# Patient Record
Sex: FEMALE | Race: BLACK OR AFRICAN AMERICAN | NOT HISPANIC OR LATINO
[De-identification: names, ages, dates, MRNs, and addresses within clinical notes are randomized per-mention and may not be internally consistent; named-entity substitution may affect disease eponyms.]

---

## 2022-01-26 ENCOUNTER — NON-APPOINTMENT (OUTPATIENT)
Age: 47
End: 2022-01-26

## 2022-01-26 ENCOUNTER — APPOINTMENT (OUTPATIENT)
Dept: PHYSICAL MEDICINE AND REHAB | Facility: CLINIC | Age: 47
End: 2022-01-26
Payer: COMMERCIAL

## 2022-01-26 VITALS
HEIGHT: 67 IN | DIASTOLIC BLOOD PRESSURE: 80 MMHG | SYSTOLIC BLOOD PRESSURE: 124 MMHG | OXYGEN SATURATION: 98 % | WEIGHT: 200 LBS | TEMPERATURE: 97.3 F | RESPIRATION RATE: 17 BRPM | BODY MASS INDEX: 31.39 KG/M2 | HEART RATE: 87 BPM

## 2022-01-26 PROBLEM — Z00.00 ENCOUNTER FOR PREVENTIVE HEALTH EXAMINATION: Status: ACTIVE | Noted: 2022-01-26

## 2022-01-26 PROCEDURE — 36415 COLL VENOUS BLD VENIPUNCTURE: CPT

## 2022-01-26 PROCEDURE — 99215 OFFICE O/P EST HI 40 MIN: CPT | Mod: 25

## 2022-01-27 LAB
CCP AB SER IA-ACNC: <8 UNITS
CRP SERPL-MCNC: 8 MG/L
ERYTHROCYTE [SEDIMENTATION RATE] IN BLOOD BY WESTERGREN METHOD: 36 MM/HR
RF+CCP IGG SER-IMP: NEGATIVE
RHEUMATOID FACT SER QL: <10 IU/ML

## 2022-01-30 LAB — ANA SER IF-ACNC: NEGATIVE

## 2022-01-30 NOTE — HISTORY OF PRESENT ILLNESS
[FreeTextEntry1] : Patient was last seen in the office on June 20, 2019\par \par 46-year-old female presents for diffuse joint pain \par The patient denies a fall or overuse\par \par left elbow pain\par Pain:  7/10 Worse: 10/10 Quality: sharp  Frequency: constant\par The pain starts in the elbow and radiates to the fingers.  The pain is aggravated with .\par \par bilateral hips\par Pain:  8/10 Worse: 10/10 Quality: sharp  Frequency: constant\par Patient starts on the outside of her hips.  The pain is worse with sitting more than 20 minutes.  She cannot lay on either side of her body at night due to pain.\par \par low back pain\par Pain:  8/10 Worse: 10/10 Quality: sharp  Frequency: constant\par She has a constant stiffness that starts in the middle of her back radiates out to the side of her hips.  She denies pain radiating down the legs.  Her pain is worse with trying to stand after period of sit.  She denies bowel bladder difficulties.\par

## 2022-01-30 NOTE — DATA REVIEWED
[FreeTextEntry1] : \par EMG/NCV of the upper extremities formed on June 20, 2019\par 1.	Normal EMG/NCV. Her’s cervical pain is not a confounding source of her right shoulder pain.\par \par RIGHT KNEE XRAY AP LATERAL AND OBLIQUE 3 VIEWS\par IMPRESSION: There are mild osteoarthritic degenerative changes. M17.11\par There are no fractures.\par \par Xray of the shoulder neg.\par Xray of the lumbar: mild arthritis.\par MRI of the cervical spine reveals thickening of the longitudinal ligament T1/T2  effacing the epidural space. Reversal of cervical lordosis\par Mri of the  right shoulder reveals low grade partial thickness tear of the inferior fibers of the infraspinatus tendon. Suspected SLAP tear of the labrum\par

## 2022-01-30 NOTE — PHYSICAL EXAM
[FreeTextEntry1] : Physical Exam\par \par General:\par     well developed, well nourished, in no acute distress.  \par Head:\par     normocephalic and atraumatic.  \par Eyes:\par     PERRL/EOM intact, conjunctiva and sclera clear with out nystagmus.  \par Ears:\par     TM's intact and clear with normal canals with grossly normal hearing.  \par Nose:\par     no deformity, discharge, inflammation, or lesions.  \par Mouth:\par     no deformity or lesions with good dentition.  \par Neck:\par    Spasm in the paracervical muscles R>L. negative Spurling's\par Chest Wall:\par     no deformities \par Lungs:\par     clear bilaterally to auscultation.  \par Heart:\par     non-displaced PMI, chest non-tender; regular rate and rhythm, S1, S2 without murmurs, rubs, or gallops\par Abdomen:\par      normal bowel sounds; no hepatosplenomegaly no ventral,umbilical hernias or masses noted.  \par Msk:\par      thoracic spine: Tenderness in the paraspinal muscles between the shoulder blades. \par Pulses:\par     pulses normal in all 4 extremities.  \par Extremities:\par     LUE: Shoulder flexion 0-100 abduction 0-100 MS 4-/5 mild tender\par Elbow FAROM, MS 4/5 reflexes 2/4 mild tender with pain on resisted wrist extension\par Wrist FAROM, MS 5/5 reflexes 2/4\par sensation is intact to light touch, pinprick and proprioception\par \par RUE: Shoulder flexion 100 abduction 0-100 MS 4-/5 + tenderness in the anterior and posterior capsule. tender over the top of the shoulder\par Elbow FAROM, MS 4/5 reflexes 2/4\par Wrist FAROM, MS 5/5 reflexes 2/4\par sensation is intact to light touch, pinprick and proprioception\par \par LLE: Hip FAROM, MS 4/5 and on palpation of the hip bursa\par knee: FAROM, MS 5/5 Reflexes 2/4\par ankle: FAROM, MS 5/5 reflexes 2/4\par sensation is intact to light touch, pinprick and proprioception\par \par RLE: Hip FAROM, MS 4/5 and on palpation of the hip bursa\par knee: FAROM, MS 5/5 Reflexes 2/4\par ankle: FAROM, MS 5/5 reflexes 2/4\par sensation is intact to light touch, pinprick and proprioception\par \par Neurologic:\par     Gait: Spontaneous, reciprocal and safe without an assistive device \par \par

## 2022-02-01 LAB — DSDNA AB SER-ACNC: 16 IU/ML

## 2022-02-21 DIAGNOSIS — Z78.9 OTHER SPECIFIED HEALTH STATUS: ICD-10-CM

## 2022-02-21 RX ORDER — CYCLOBENZAPRINE HYDROCHLORIDE 5 MG/1
5 TABLET, FILM COATED ORAL
Refills: 0 | Status: ACTIVE | COMMUNITY

## 2022-02-21 RX ORDER — GABAPENTIN 100 MG/1
100 CAPSULE ORAL
Refills: 0 | Status: ACTIVE | COMMUNITY

## 2022-02-21 RX ORDER — FLUTICASONE PROPIONATE 50 MCG
50 SPRAY, SUSPENSION NASAL
Refills: 0 | Status: ACTIVE | COMMUNITY

## 2022-03-03 ENCOUNTER — APPOINTMENT (OUTPATIENT)
Dept: PHYSICAL MEDICINE AND REHAB | Facility: CLINIC | Age: 47
End: 2022-03-03

## 2022-03-04 ENCOUNTER — APPOINTMENT (OUTPATIENT)
Dept: RHEUMATOLOGY | Facility: CLINIC | Age: 47
End: 2022-03-04
Payer: COMMERCIAL

## 2022-03-04 VITALS
HEIGHT: 67 IN | OXYGEN SATURATION: 98 % | WEIGHT: 200 LBS | SYSTOLIC BLOOD PRESSURE: 177 MMHG | DIASTOLIC BLOOD PRESSURE: 108 MMHG | BODY MASS INDEX: 31.39 KG/M2 | HEART RATE: 83 BPM | TEMPERATURE: 97.3 F

## 2022-03-04 LAB
ALBUMIN SERPL ELPH-MCNC: 4.8 G/DL
ALP BLD-CCNC: 52 U/L
ALT SERPL-CCNC: 14 U/L
ANION GAP SERPL CALC-SCNC: 12 MMOL/L
AST SERPL-CCNC: 19 U/L
BASOPHILS # BLD AUTO: 0.04 K/UL
BASOPHILS NFR BLD AUTO: 0.6 %
BILIRUB SERPL-MCNC: 0.3 MG/DL
BUN SERPL-MCNC: 11 MG/DL
CALCIUM SERPL-MCNC: 10.1 MG/DL
CHLORIDE SERPL-SCNC: 101 MMOL/L
CO2 SERPL-SCNC: 26 MMOL/L
CREAT SERPL-MCNC: 0.7 MG/DL
CRP SERPL-MCNC: 7 MG/L
DEPRECATED KAPPA LC FREE/LAMBDA SER: 1.4 RATIO
EGFR: 109 ML/MIN/1.73M2
EOSINOPHIL # BLD AUTO: 0.11 K/UL
EOSINOPHIL NFR BLD AUTO: 1.6 %
GLUCOSE SERPL-MCNC: 90 MG/DL
HCT VFR BLD CALC: 36.2 %
HGB BLD-MCNC: 11.8 G/DL
IGA SER QL IEP: 185 MG/DL
IGG SER QL IEP: 1631 MG/DL
IGM SER QL IEP: 138 MG/DL
IMM GRANULOCYTES NFR BLD AUTO: 0.1 %
KAPPA LC CSF-MCNC: 1.41 MG/DL
KAPPA LC SERPL-MCNC: 1.98 MG/DL
LYMPHOCYTES # BLD AUTO: 2.23 K/UL
LYMPHOCYTES NFR BLD AUTO: 32.7 %
MAN DIFF?: NORMAL
MCHC RBC-ENTMCNC: 30.4 PG
MCHC RBC-ENTMCNC: 32.6 GM/DL
MCV RBC AUTO: 93.3 FL
MONOCYTES # BLD AUTO: 0.41 K/UL
MONOCYTES NFR BLD AUTO: 6 %
NEUTROPHILS # BLD AUTO: 4.02 K/UL
NEUTROPHILS NFR BLD AUTO: 59 %
PLATELET # BLD AUTO: 336 K/UL
POTASSIUM SERPL-SCNC: 4.3 MMOL/L
PROT SERPL-MCNC: 7.9 G/DL
RBC # BLD: 3.88 M/UL
RBC # FLD: 12.9 %
SODIUM SERPL-SCNC: 139 MMOL/L
WBC # FLD AUTO: 6.82 K/UL

## 2022-03-04 PROCEDURE — 99205 OFFICE O/P NEW HI 60 MIN: CPT
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
## 2022-03-04 NOTE — PHYSICAL EXAM
[General Appearance - Alert] : alert [General Appearance - In No Acute Distress] : in no acute distress [Sclera] : the sclera and conjunctiva were normal [Outer Ear] : the ears and nose were normal in appearance [Oropharynx] : the oropharynx was normal [Neck Appearance] : the appearance of the neck was normal [Neck Cervical Mass (___cm)] : no neck mass was observed [Jugular Venous Distention Increased] : there was no jugular-venous distention [Thyroid Diffuse Enlargement] : the thyroid was not enlarged [Thyroid Nodule] : there were no palpable thyroid nodules [Auscultation Breath Sounds / Voice Sounds] : lungs were clear to auscultation bilaterally [Heart Rate And Rhythm] : heart rate was normal and rhythm regular [Heart Sounds] : normal S1 and S2 [Heart Sounds Gallop] : no gallops [Murmurs] : no murmurs [Heart Sounds Pericardial Friction Rub] : no pericardial rub [Edema] : there was no peripheral edema [Bowel Sounds] : normal bowel sounds [Abdomen Soft] : soft [Abdomen Tenderness] : non-tender [Abdomen Mass (___ Cm)] : no abdominal mass palpated [Cervical Lymph Nodes Enlarged Posterior Bilaterally] : posterior cervical [Cervical Lymph Nodes Enlarged Anterior Bilaterally] : anterior cervical [Supraclavicular Lymph Nodes Enlarged Bilaterally] : supraclavicular [Skin Color & Pigmentation] : normal skin color and pigmentation [Skin Turgor] : normal skin turgor [] : no rash [No Focal Deficits] : no focal deficits [Oriented To Time, Place, And Person] : oriented to person, place, and time [Impaired Insight] : insight and judgment were intact [Affect] : the affect was normal [FreeTextEntry1] : No synovitis;  B/L elbows w/ tenderness over lateral epicondyles (L>R), (+)pain upon resisted wrist extension;  neck w/pain upon extension and B/L lateral bending;  (+)tenderness over B/L trochanteric bursae

## 2022-03-04 NOTE — CONSULT LETTER
[Dear  ___] : Dear  [unfilled], [Consult Letter:] : I had the pleasure of evaluating your patient, [unfilled]. [Please see my note below.] : Please see my note below. [Consult Closing:] : Thank you very much for allowing me to participate in the care of this patient.  If you have any questions, please do not hesitate to contact me. [Sincerely,] : Sincerely, [FreeTextEntry3] : Andrea Spear MD\par Rheumatology\par [facility]\par  of Medicine\par [facility] \par \par [address]\[place] [address]\par \par [address]\[place] [address]\par \par [address] [address]\par \par phone:  [phone]\par fax:      [phone]\par

## 2022-03-07 LAB
ENA SS-A AB SER IA-ACNC: <0.2 AL
ENA SS-B AB SER IA-ACNC: <0.2 AL
ERYTHROCYTE [SEDIMENTATION RATE] IN BLOOD BY WESTERGREN METHOD: 32 MM/HR

## 2022-03-08 ENCOUNTER — RX RENEWAL (OUTPATIENT)
Age: 47
End: 2022-03-08

## 2022-03-30 ENCOUNTER — APPOINTMENT (OUTPATIENT)
Dept: INTERNAL MEDICINE | Facility: CLINIC | Age: 47
End: 2022-03-30
Payer: COMMERCIAL

## 2022-03-30 VITALS
RESPIRATION RATE: 17 BRPM | HEIGHT: 67 IN | OXYGEN SATURATION: 99 % | WEIGHT: 199 LBS | SYSTOLIC BLOOD PRESSURE: 144 MMHG | DIASTOLIC BLOOD PRESSURE: 80 MMHG | HEART RATE: 80 BPM | TEMPERATURE: 97.8 F | BODY MASS INDEX: 31.23 KG/M2

## 2022-03-30 DIAGNOSIS — B34.1 ENTEROVIRUS INFECTION, UNSPECIFIED: ICD-10-CM

## 2022-03-30 DIAGNOSIS — K52.9 NONINFECTIVE GASTROENTERITIS AND COLITIS, UNSPECIFIED: ICD-10-CM

## 2022-03-30 DIAGNOSIS — R50.9 FEVER, UNSPECIFIED: ICD-10-CM

## 2022-03-30 PROCEDURE — 99213 OFFICE O/P EST LOW 20 MIN: CPT

## 2022-03-30 RX ORDER — OMEPRAZOLE 20 MG/1
20 CAPSULE, DELAYED RELEASE ORAL
Qty: 30 | Refills: 0 | Status: ACTIVE | COMMUNITY
Start: 2022-03-30 | End: 1900-01-01

## 2022-03-30 RX ORDER — ONDANSETRON 4 MG/1
4 TABLET, ORALLY DISINTEGRATING ORAL DAILY
Qty: 30 | Refills: 0 | Status: ACTIVE | COMMUNITY
Start: 2022-03-30 | End: 1900-01-01

## 2022-03-31 ENCOUNTER — NON-APPOINTMENT (OUTPATIENT)
Age: 47
End: 2022-03-31

## 2022-03-31 PROBLEM — B34.1 ENTEROVIRUS INFECTION: Status: ACTIVE | Noted: 2022-03-31

## 2022-03-31 LAB
RAPID RVP RESULT: DETECTED
RV+EV RNA SPEC QL NAA+PROBE: DETECTED
SARS-COV-2 RNA PNL RESP NAA+PROBE: NOT DETECTED

## 2022-03-31 NOTE — REVIEW OF SYSTEMS
[Fever] : fever [Nausea] : nausea [Diarrhea] : diarrhea [Vomiting] : vomiting [Heartburn] : heartburn [Chills] : no chills [Recent Change In Weight] : ~T no recent weight change [Vision Problems] : no vision problems [Earache] : no earache [Nasal Discharge] : no nasal discharge [Sore Throat] : no sore throat [Chest Pain] : no chest pain [Palpitations] : no palpitations [Shortness Of Breath] : no shortness of breath [Wheezing] : no wheezing [Abdominal Pain] : no abdominal pain [Dysuria] : no dysuria [Hematuria] : no hematuria [Frequency] : no frequency [Joint Pain] : no joint pain [Joint Swelling] : no joint swelling [Skin Rash] : no skin rash [Headache] : no headache [Dizziness] : no dizziness [Anxiety] : no anxiety [Depression] : no depression [Swollen Glands] : no swollen glands

## 2022-03-31 NOTE — HISTORY OF PRESENT ILLNESS
[FreeTextEntry1] : stomach issues [de-identified] : 45 y/o female presents with concerns for fever, nausea, vomiting, and  diarrhea x 2 days. She feels otherwise well and offers no other acute complaints or concerns. ROS as documented below.

## 2022-04-08 ENCOUNTER — APPOINTMENT (OUTPATIENT)
Dept: RHEUMATOLOGY | Facility: CLINIC | Age: 47
End: 2022-04-08
Payer: COMMERCIAL

## 2022-04-08 ENCOUNTER — TRANSCRIPTION ENCOUNTER (OUTPATIENT)
Age: 47
End: 2022-04-08

## 2022-04-08 VITALS
WEIGHT: 207 LBS | HEIGHT: 67 IN | BODY MASS INDEX: 32.49 KG/M2 | OXYGEN SATURATION: 99 % | SYSTOLIC BLOOD PRESSURE: 127 MMHG | HEART RATE: 81 BPM | DIASTOLIC BLOOD PRESSURE: 80 MMHG | TEMPERATURE: 97.8 F

## 2022-04-08 PROCEDURE — 99214 OFFICE O/P EST MOD 30 MIN: CPT

## 2022-04-08 NOTE — HISTORY OF PRESENT ILLNESS
[Dry Mouth] : dry mouth [Arthralgias] : arthralgias [Dry Eyes] : dry eyes [FreeTextEntry1] : Feeling "the same" since last visit.  Still w/ polyarticular joint pains and neck/low back pain, unchanged.  No new complaints. [Anorexia] : no anorexia [Weight Loss] : no weight loss [Malaise] : no malaise [Fever] : no fever [Chills] : no chills [Fatigue] : no fatigue [Malar Facial Rash] : no malar facial rash [Skin Lesions] : no lesions [Skin Nodules] : no skin nodules [Oral Ulcers] : no oral ulcers [Cough] : no cough [Dysphonia] : no dysphonia [Dysphagia] : no dysphagia [Shortness of Breath] : no shortness of breath [Chest Pain] : no chest pain [Joint Swelling] : no joint swelling [Joint Warmth] : no joint warmth [Joint Deformity] : no joint deformity [Decreased ROM] : no decreased range of motion [Morning Stiffness] : no morning stiffness [Falls] : no falls [Difficulty Standing] : no difficulty standing [Difficulty Walking] : no difficulty walking [Dyspnea] : no dyspnea [Myalgias] : no myalgias [Muscle Weakness] : no muscle weakness [Muscle Spasms] : no muscle spasms [Muscle Cramping] : no muscle cramping [Visual Changes] : no visual changes [Eye Pain] : no eye pain [Eye Redness] : no eye redness

## 2022-04-08 NOTE — ASSESSMENT
[FreeTextEntry1] : 46 year old female presents with polyarticular joint pains, which appears to be multifactorial:\par 1)  B/L elbows:  most c/w lateral epicondylitis\par   - Minimize activities placing stress on the elbow\par   - Melxociam 15mg daily prn\par   - ice packs\par   - tennis elbow band\par 2)  B/L lateral pelvis area:  most c/w trochanteric bursitis.\par   - Melxociam 15mg daily prn\par   - ice packs\par   - If no improvement by next visit, pt to consider corticosteroid injections\par 3)  Neck pain - x-rays revealed mild OA changes as well as straightening of the normal lordotic curve, c/w muscle spasm.  Also w/ chronic low back pain\par   - Refer to PT / reiterated imporance of exercises\par   - Meloxicam as above\par   - heating pads\par   - OTC topical analgesics\par 4)  RIght shoulder pain:  most likely radiating from neck\par 5)  Pt also w/ elevated ESR/CRP of unclear etiology.  w/u for underlying CTD negative.  ?related to viral syndrome (pt w/ recent (+)entero/rhinovirus\par   - Cont top monitor - will repeat at next visit.\par   - artificial tears\par   - sugar-free lemon/lime sucking candies and/or chewing gum\par   - Biotene

## 2022-05-06 ENCOUNTER — APPOINTMENT (OUTPATIENT)
Dept: RHEUMATOLOGY | Facility: CLINIC | Age: 47
End: 2022-05-06

## 2022-06-01 ENCOUNTER — RX RENEWAL (OUTPATIENT)
Age: 47
End: 2022-06-01

## 2022-06-21 ENCOUNTER — APPOINTMENT (OUTPATIENT)
Dept: INTERNAL MEDICINE | Facility: CLINIC | Age: 47
End: 2022-06-21

## 2022-06-21 VITALS
HEART RATE: 82 BPM | RESPIRATION RATE: 17 BRPM | TEMPERATURE: 97.7 F | BODY MASS INDEX: 31.39 KG/M2 | HEIGHT: 67 IN | DIASTOLIC BLOOD PRESSURE: 96 MMHG | SYSTOLIC BLOOD PRESSURE: 160 MMHG | OXYGEN SATURATION: 97 % | WEIGHT: 200 LBS

## 2022-06-21 VITALS — DIASTOLIC BLOOD PRESSURE: 90 MMHG | SYSTOLIC BLOOD PRESSURE: 150 MMHG

## 2022-06-21 PROCEDURE — 99214 OFFICE O/P EST MOD 30 MIN: CPT

## 2022-06-23 ENCOUNTER — APPOINTMENT (OUTPATIENT)
Dept: PHYSICAL MEDICINE AND REHAB | Facility: CLINIC | Age: 47
End: 2022-06-23
Payer: COMMERCIAL

## 2022-06-23 VITALS
DIASTOLIC BLOOD PRESSURE: 88 MMHG | HEART RATE: 96 BPM | SYSTOLIC BLOOD PRESSURE: 136 MMHG | HEIGHT: 66 IN | RESPIRATION RATE: 18 BRPM | OXYGEN SATURATION: 98 % | WEIGHT: 206.8 LBS | BODY MASS INDEX: 33.23 KG/M2 | TEMPERATURE: 97.3 F

## 2022-06-23 DIAGNOSIS — M54.50 LOW BACK PAIN, UNSPECIFIED: ICD-10-CM

## 2022-06-23 PROCEDURE — 99213 OFFICE O/P EST LOW 20 MIN: CPT

## 2022-06-27 PROBLEM — M54.50 ACUTE BILATERAL LOW BACK PAIN WITHOUT SCIATICA: Status: ACTIVE | Noted: 2022-01-26

## 2022-06-27 NOTE — HISTORY OF PRESENT ILLNESS
[FreeTextEntry1] : Patient was last seen in the office on January 26, 2022\par \par 46-year-old female presents for diffuse joint pain \par The patient denies a fall or overuse\par \par left elbow pain\par Pain:  7/10 Worse: 10/10 Quality: sharp  Frequency: constant\par The pain starts in the elbow and radiates to the fingers.  The pain is aggravated with .\par \par bilateral hips\par Pain:  8/10 Worse: 10/10 Quality: sharp  Frequency: constant\par Patient starts on the outside of her hips.  The pain is worse with sitting more than 20 minutes.  She cannot lay on either side of her body at night due to pain.\par \par low back pain\par Pain:  8/10 Worse: 10/10 Quality: sharp  Frequency: constant\par She has a constant stiffness that starts in the middle of her back radiates out to the side of her hips.  She denies pain radiating down the legs.  Her pain is worse with trying to stand after period of sit.  She denies bowel bladder difficulties.\par \par She has seen Dr. Spear of rheumatology.  Testing revealed an elevated ESR/CRP of unclear etiology.\par \par

## 2022-06-29 ENCOUNTER — APPOINTMENT (OUTPATIENT)
Dept: INTERNAL MEDICINE | Facility: CLINIC | Age: 47
End: 2022-06-29

## 2022-07-02 NOTE — REVIEW OF SYSTEMS
[Headache] : headache [Fever] : no fever [Chills] : no chills [Recent Change In Weight] : ~T no recent weight change [Vision Problems] : no vision problems [Earache] : no earache [Nasal Discharge] : no nasal discharge [Sore Throat] : no sore throat [Chest Pain] : no chest pain [Palpitations] : no palpitations [Shortness Of Breath] : no shortness of breath [Wheezing] : no wheezing [Abdominal Pain] : no abdominal pain [Nausea] : no nausea [Diarrhea] : diarrhea [Vomiting] : no vomiting [Heartburn] : no heartburn [Dysuria] : no dysuria [Hematuria] : no hematuria [Frequency] : no frequency [Joint Pain] : no joint pain [Joint Swelling] : no joint swelling [Skin Rash] : no skin rash [Dizziness] : no dizziness [Anxiety] : no anxiety [Depression] : no depression [Swollen Glands] : no swollen glands

## 2022-07-02 NOTE — HISTORY OF PRESENT ILLNESS
[de-identified] : 45 y/o female presents for follow up. She has frequent migraines and is currently on emgality and following with Neurology. She is also scheduled to followup with ophthalmology due to increased eye pressure to r/o glaucoma. She states she had stopped taking her HCTZ for 1 week. She feels otherwise well and offers no other acute complaints or concerns. ROS as documented below. [FreeTextEntry1] : follow up

## 2022-07-08 ENCOUNTER — RX RENEWAL (OUTPATIENT)
Age: 47
End: 2022-07-08

## 2022-07-27 ENCOUNTER — APPOINTMENT (OUTPATIENT)
Dept: PHYSICAL MEDICINE AND REHAB | Facility: CLINIC | Age: 47
End: 2022-07-27

## 2022-10-14 ENCOUNTER — RX RENEWAL (OUTPATIENT)
Age: 47
End: 2022-10-14

## 2022-10-28 ENCOUNTER — APPOINTMENT (OUTPATIENT)
Dept: ANTEPARTUM | Facility: CLINIC | Age: 47
End: 2022-10-28

## 2022-11-11 ENCOUNTER — APPOINTMENT (OUTPATIENT)
Dept: ANTEPARTUM | Facility: CLINIC | Age: 47
End: 2022-11-11

## 2022-11-11 ENCOUNTER — ASOB RESULT (OUTPATIENT)
Age: 47
End: 2022-11-11

## 2022-11-11 PROCEDURE — 76856 US EXAM PELVIC COMPLETE: CPT | Mod: 59

## 2022-11-11 PROCEDURE — 76830 TRANSVAGINAL US NON-OB: CPT

## 2023-01-11 ENCOUNTER — RX RENEWAL (OUTPATIENT)
Age: 48
End: 2023-01-11

## 2023-02-22 ENCOUNTER — APPOINTMENT (OUTPATIENT)
Dept: PHYSICAL MEDICINE AND REHAB | Facility: CLINIC | Age: 48
End: 2023-02-22

## 2023-03-01 ENCOUNTER — APPOINTMENT (OUTPATIENT)
Dept: PHYSICAL MEDICINE AND REHAB | Facility: CLINIC | Age: 48
End: 2023-03-01
Payer: COMMERCIAL

## 2023-03-01 VITALS
TEMPERATURE: 98.7 F | DIASTOLIC BLOOD PRESSURE: 80 MMHG | RESPIRATION RATE: 12 BRPM | SYSTOLIC BLOOD PRESSURE: 130 MMHG | HEART RATE: 95 BPM | HEIGHT: 66 IN | BODY MASS INDEX: 32.62 KG/M2 | OXYGEN SATURATION: 99 % | WEIGHT: 203 LBS

## 2023-03-01 PROCEDURE — 99214 OFFICE O/P EST MOD 30 MIN: CPT

## 2023-03-02 NOTE — HISTORY OF PRESENT ILLNESS
[FreeTextEntry1] : Patient was last seen in the office on June 23, 2022\par \par 47-year-old female presents for diffuse joint pain \par The patient denies a fall or overuse\par \par Blood work for evaluation of inflammatory disease revealed an elevated ESR/CRP of unclear etiology. She has not seen Dr. Spear of rheumatology for almost a year.  She has recently made a follow-up appointment.\par She has been using gabapentin/naproxen on an as-needed basis.  \par \par She has increased neck and right arm pain\par \par She saw Dr Herrera of neurology for neck pain. He felt she had a migraine. He ordered an MRI of the head. She does not have the result.\par \par \par Neck pain\par Pain:  8/10 Worse: 10/10 Quality: stabbing  Frequency: constant\par The pain starts in the right side of the neck and radiates down the arm to the fingers\par Turning her head and raising her shoulder increases her right neck/arm pain\par hands feel weak

## 2023-03-02 NOTE — PHYSICAL EXAM
[FreeTextEntry1] : Physical Exam\par \par General:\par     well developed, well nourished, in no acute distress.  \par Head:\par     normocephalic and atraumatic.  \par Eyes:\par     PERRL/EOM intact, conjunctiva and sclera clear with out nystagmus.  \par Ears:\par     TM's intact and clear with normal canals with grossly normal hearing.  \par Nose:\par     no deformity, discharge, inflammation, or lesions.  \par Mouth:\par     no deformity or lesions with good dentition.  \par Neck:\par    Spasm in the paracervical muscles R>L. negative Spurling's\par Chest Wall:\par     no deformities \par Lungs:\par     clear bilaterally to auscultation.  \par Heart:\par     non-displaced PMI, chest non-tender; regular rate and rhythm, S1, S2 without murmurs, rubs, or gallops\par Abdomen:\par      normal bowel sounds; no hepatosplenomegaly no ventral,umbilical hernias or masses noted.  \par Msk:\par      thoracic spine: Tenderness in the paraspinal muscles between the shoulder blades. \par Pulses:\par     pulses normal in all 4 extremities.  \par Extremities:\par     LUE: Shoulder flexion 0-100 abduction 0-100 MS 4-/5 mild tender\par Elbow FAROM, MS 4/5 reflexes 2/4 mild tender with pain on resisted wrist extension\par Wrist FAROM, MS 5/5 reflexes 2/4\par sensation is intact to light touch, pinprick and proprioception\par \par RUE: Shoulder flexion 120 abduction 0-120 MS 4-/5 + tenderness in the anterior and posterior capsule. tender over the top of the shoulder\par Elbow FAROM, MS 4/5 reflexes 2/4\par Wrist FAROM, MS 5/5 reflexes 2/4\par sensation is intact to light touch, pinprick and proprioception\par \par LLE: Hip FAROM, MS 4/5 and on palpation of the hip bursa\par knee: FAROM, MS 5/5 Reflexes 2/4\par ankle: FAROM, MS 5/5 reflexes 2/4\par sensation is intact to light touch, pinprick and proprioception\par \par RLE: Hip FAROM, MS 4/5 and on palpation of the hip bursa\par knee: FAROM, MS 5/5 Reflexes 2/4\par ankle: FAROM, MS 5/5 reflexes 2/4\par sensation is intact to light touch, pinprick and proprioception\par \par Neurologic:\par     Gait: Spontaneous, reciprocal and safe without an assistive device \par \par

## 2023-03-31 ENCOUNTER — APPOINTMENT (OUTPATIENT)
Dept: RHEUMATOLOGY | Facility: CLINIC | Age: 48
End: 2023-03-31
Payer: COMMERCIAL

## 2023-03-31 VITALS
HEIGHT: 66 IN | DIASTOLIC BLOOD PRESSURE: 70 MMHG | SYSTOLIC BLOOD PRESSURE: 128 MMHG | HEART RATE: 88 BPM | OXYGEN SATURATION: 98 % | TEMPERATURE: 98.3 F | BODY MASS INDEX: 32.3 KG/M2 | WEIGHT: 201 LBS

## 2023-03-31 DIAGNOSIS — M77.12 LATERAL EPICONDYLITIS, LEFT ELBOW: ICD-10-CM

## 2023-03-31 DIAGNOSIS — R70.0 ELEVATED ERYTHROCYTE SEDIMENTATION RATE: ICD-10-CM

## 2023-03-31 DIAGNOSIS — M54.50 LOW BACK PAIN, UNSPECIFIED: ICD-10-CM

## 2023-03-31 DIAGNOSIS — R68.2 DRY MOUTH, UNSPECIFIED: ICD-10-CM

## 2023-03-31 DIAGNOSIS — H04.123 DRY EYE SYNDROME OF BILATERAL LACRIMAL GLANDS: ICD-10-CM

## 2023-03-31 DIAGNOSIS — M75.81 OTHER SHOULDER LESIONS, RIGHT SHOULDER: ICD-10-CM

## 2023-03-31 DIAGNOSIS — M25.511 PAIN IN RIGHT SHOULDER: ICD-10-CM

## 2023-03-31 PROCEDURE — 20610 DRAIN/INJ JOINT/BURSA W/O US: CPT | Mod: 50

## 2023-03-31 PROCEDURE — 99214 OFFICE O/P EST MOD 30 MIN: CPT | Mod: 25

## 2023-03-31 RX ORDER — METHYLPRED ACET/NACL,ISO-OS/PF 40 MG/ML
40 VIAL (ML) INJECTION
Qty: 2 | Refills: 0 | Status: COMPLETED | OUTPATIENT
Start: 2023-03-31

## 2023-03-31 RX ORDER — MELOXICAM 15 MG/1
15 TABLET ORAL
Qty: 90 | Refills: 0 | Status: COMPLETED | COMMUNITY
Start: 2022-01-28 | End: 2023-03-31

## 2023-03-31 RX ADMIN — METHYLPREDNISOLONE ACETATE MG/ML: 40 INJECTION, SUSPENSION INTRA-ARTICULAR; INTRALESIONAL; INTRAMUSCULAR; SOFT TISSUE at 00:00

## 2023-03-31 NOTE — ASSESSMENT
[FreeTextEntry1] : 46 year old female presents with polyarticular joint pains, which appears to be multifactorial:\par 1)  B/L elbows:  most c/w lateral epicondylitis.  Resolved.\par   - Minimize activities placing stress on the elbow\par   - Cont naproxen 375mg BID prn\par   - ice packs\par   - tennis elbow band\par 2)  B/L lateral pelvis area:  most c/w trochanteric bursitis.\par   - INjected Depo Medrol 40mg to left trochanteric bursa\par   - Naproxen prn as above\par   - ice packs\par 3)  Neck pain - x-rays revealed mild OA changes as well as straightening of the normal lordotic curve, c/w muscle spasm.  Also w/ chronic low back pain\par   - Cont PT / reiterated imporance of exercises\par   - Naproxen as above\par   - heating pads\par   - OTC topical analgesics\par 4)  RIght shoulder pain: most c/w RTC tendonitis\par   - Injected Depo Medrol 40mg t right shoulder.\par   - Cont shoulder exercises\par   - Analgesia as above\par 5)  Pt also w/ elevated ESR/CRP of unclear etiology.  w/u for underlying CTD negative.  ?related to viral syndrome (pt w/ recent (+)entero/rhinovirus\par   - Cont to monitor.

## 2023-03-31 NOTE — PROCEDURE
[Today's Date:] : Date: [unfilled] [Arthrocentesis] : arthrocentesis was performed [Soft Tissue Injection] : soft tissue injection was performed [Patient] : the patient [Risks] : risks [Benefits] : benefits [Alternatives] : alternatives [Consent Obtained] : written consent was obtained prior to the procedure and is detailed in the patient's record [Therapeutic] : therapeutic [#1 Site: ______] : #1 site identified in the [unfilled] [#2 Site: ___] : # 2 site identified in the [unfilled] [Ethyl Chloride] : ethyl chloride [___ ml Inj] : [unfilled] ~Uml [1%] : 1% [Without Epi] : without epinephrine [Chlorhexidine] : chlorhexidine [22 gauge 1.5 inch] : A 22 gauge 1.5 inch needle was used [Depomedrol ___ mg] : Depomedrol [unfilled] mg [Tolerated Well] : the patient tolerated the procedure well [No Complications] : there were no complications [Instructions Given] : handouts/patient instructions were given to patient [Patient Instructed to Call] : patient was instructed to call if redness at site, a decrease in range of motion or an increase in pain is noted after procedure.

## 2023-03-31 NOTE — HISTORY OF PRESENT ILLNESS
[Dry Mouth] : dry mouth [Arthralgias] : arthralgias [Dry Eyes] : dry eyes [FreeTextEntry1] : Left knee pain worse since last visit.  She also c/o numbness in her RUE and LLE (from the hip to the knee).  She reports that she saw neurology, who ordered an MRI of the C-spine and increased her dose of gabapentin.  Still w/ neck/low back pain, unchanged.  B/L elbow pain has resolved.  Also w/ right shoulder pain, unchanged.  No new complaints. [Anorexia] : no anorexia [Weight Loss] : no weight loss [Malaise] : no malaise [Fever] : no fever [Chills] : no chills [Fatigue] : no fatigue [Malar Facial Rash] : no malar facial rash [Skin Lesions] : no lesions [Skin Nodules] : no skin nodules [Oral Ulcers] : no oral ulcers [Cough] : no cough [Dysphonia] : no dysphonia [Dysphagia] : no dysphagia [Shortness of Breath] : no shortness of breath [Chest Pain] : no chest pain [Joint Swelling] : no joint swelling [Joint Warmth] : no joint warmth [Joint Deformity] : no joint deformity [Decreased ROM] : no decreased range of motion [Morning Stiffness] : no morning stiffness [Falls] : no falls [Difficulty Standing] : no difficulty standing [Difficulty Walking] : no difficulty walking [Dyspnea] : no dyspnea [Myalgias] : no myalgias [Muscle Weakness] : no muscle weakness [Muscle Spasms] : no muscle spasms [Muscle Cramping] : no muscle cramping [Visual Changes] : no visual changes [Eye Pain] : no eye pain [Eye Redness] : no eye redness

## 2023-03-31 NOTE — PHYSICAL EXAM
[General Appearance - Alert] : alert [General Appearance - In No Acute Distress] : in no acute distress [Sclera] : the sclera and conjunctiva were normal [Outer Ear] : the ears and nose were normal in appearance [Oropharynx] : the oropharynx was normal [Neck Appearance] : the appearance of the neck was normal [Neck Cervical Mass (___cm)] : no neck mass was observed [Jugular Venous Distention Increased] : there was no jugular-venous distention [Thyroid Diffuse Enlargement] : the thyroid was not enlarged [Thyroid Nodule] : there were no palpable thyroid nodules [Auscultation Breath Sounds / Voice Sounds] : lungs were clear to auscultation bilaterally [Heart Rate And Rhythm] : heart rate was normal and rhythm regular [Heart Sounds] : normal S1 and S2 [Heart Sounds Gallop] : no gallops [Murmurs] : no murmurs [Heart Sounds Pericardial Friction Rub] : no pericardial rub [Edema] : there was no peripheral edema [Bowel Sounds] : normal bowel sounds [Abdomen Soft] : soft [Abdomen Tenderness] : non-tender [Abdomen Mass (___ Cm)] : no abdominal mass palpated [Cervical Lymph Nodes Enlarged Posterior Bilaterally] : posterior cervical [Cervical Lymph Nodes Enlarged Anterior Bilaterally] : anterior cervical [Supraclavicular Lymph Nodes Enlarged Bilaterally] : supraclavicular [Skin Color & Pigmentation] : normal skin color and pigmentation [Skin Turgor] : normal skin turgor [No Focal Deficits] : no focal deficits [] : no rash [Oriented To Time, Place, And Person] : oriented to person, place, and time [Impaired Insight] : insight and judgment were intact [Affect] : the affect was normal [FreeTextEntry1] : No synovitis;  right shoulder w/ pain upon abduction and internal rotation, (+)impingement; neck w/pain upon extension and B/L lateral bending;  (+)tenderness over B/L trochanteric bursae

## 2023-04-03 ENCOUNTER — APPOINTMENT (OUTPATIENT)
Dept: PHYSICAL MEDICINE AND REHAB | Facility: CLINIC | Age: 48
End: 2023-04-03
Payer: COMMERCIAL

## 2023-04-03 VITALS
HEART RATE: 94 BPM | RESPIRATION RATE: 12 BRPM | WEIGHT: 203 LBS | BODY MASS INDEX: 32.62 KG/M2 | OXYGEN SATURATION: 97 % | HEIGHT: 66 IN | DIASTOLIC BLOOD PRESSURE: 84 MMHG | SYSTOLIC BLOOD PRESSURE: 130 MMHG | TEMPERATURE: 97.6 F

## 2023-04-03 DIAGNOSIS — M25.50 PAIN IN UNSPECIFIED JOINT: ICD-10-CM

## 2023-04-03 DIAGNOSIS — M77.11 LATERAL EPICONDYLITIS, RIGHT ELBOW: ICD-10-CM

## 2023-04-03 DIAGNOSIS — M54.2 CERVICALGIA: ICD-10-CM

## 2023-04-03 PROCEDURE — 99213 OFFICE O/P EST LOW 20 MIN: CPT

## 2023-04-05 ENCOUNTER — APPOINTMENT (OUTPATIENT)
Dept: INTERNAL MEDICINE | Facility: CLINIC | Age: 48
End: 2023-04-05
Payer: COMMERCIAL

## 2023-04-05 ENCOUNTER — NON-APPOINTMENT (OUTPATIENT)
Age: 48
End: 2023-04-05

## 2023-04-05 VITALS
HEART RATE: 83 BPM | BODY MASS INDEX: 32.62 KG/M2 | RESPIRATION RATE: 17 BRPM | HEIGHT: 66 IN | SYSTOLIC BLOOD PRESSURE: 120 MMHG | OXYGEN SATURATION: 98 % | WEIGHT: 203 LBS | TEMPERATURE: 97.8 F | DIASTOLIC BLOOD PRESSURE: 80 MMHG

## 2023-04-05 DIAGNOSIS — G43.909 MIGRAINE, UNSPECIFIED, NOT INTRACTABLE, W/OUT STATUS MIGRAINOSUS: ICD-10-CM

## 2023-04-05 DIAGNOSIS — N92.6 IRREGULAR MENSTRUATION, UNSPECIFIED: ICD-10-CM

## 2023-04-05 DIAGNOSIS — Z13.6 ENCOUNTER FOR SCREENING FOR CARDIOVASCULAR DISORDERS: ICD-10-CM

## 2023-04-05 DIAGNOSIS — E66.9 OBESITY, UNSPECIFIED: ICD-10-CM

## 2023-04-05 DIAGNOSIS — R73.03 PREDIABETES.: ICD-10-CM

## 2023-04-05 DIAGNOSIS — R29.818 OTHER SYMPTOMS AND SIGNS INVOLVING THE NERVOUS SYSTEM: ICD-10-CM

## 2023-04-05 DIAGNOSIS — R79.89 OTHER SPECIFIED ABNORMAL FINDINGS OF BLOOD CHEMISTRY: ICD-10-CM

## 2023-04-05 DIAGNOSIS — Z00.00 ENCOUNTER FOR GENERAL ADULT MEDICAL EXAMINATION W/OUT ABNORMAL FINDINGS: ICD-10-CM

## 2023-04-05 PROCEDURE — 99215 OFFICE O/P EST HI 40 MIN: CPT | Mod: 25

## 2023-04-05 PROCEDURE — 93000 ELECTROCARDIOGRAM COMPLETE: CPT

## 2023-04-05 PROCEDURE — 99396 PREV VISIT EST AGE 40-64: CPT | Mod: 25

## 2023-04-05 PROCEDURE — 36415 COLL VENOUS BLD VENIPUNCTURE: CPT

## 2023-04-06 PROBLEM — M25.50 DIFFUSE ARTHRALGIA: Status: ACTIVE | Noted: 2022-01-26

## 2023-04-06 PROBLEM — M54.2 NECK PAIN: Status: ACTIVE | Noted: 2022-03-04

## 2023-04-06 PROBLEM — M77.11 LATERAL EPICONDYLITIS OF RIGHT ELBOW: Status: ACTIVE | Noted: 2022-01-26

## 2023-04-06 NOTE — HISTORY OF PRESENT ILLNESS
[FreeTextEntry1] : \par \par 47-year-old female presents for diffuse joint pain \par The patient denies a fall or overuse\par \par Blood work for evaluation of inflammatory disease revealed an elevated ESR/CRP of unclear etiology.\par She recently followed up with Dr. Spear of rheumatology.  He gave her injections to the right shoulder and left hip with significant decrease in her pain in this area\par She has not been able to start restorative physical therapy due to her busy work schedule.\par Gabapentin 200 mg p.o. nightly has not been as effective in decreasing her pain \par Duloxetine 20 mg p.o. daily has helped improve her mood and decrease her achiness\par She is taking quilipta 60 mg po qd for migraines and uses ubrevely 100 mg po qd for break thru migraines\par \par Neck pain\par Pain:  8/10 Worse: 10/10 Quality: stabbing  Frequency: constant\par The pain starts in the right side of the neck and radiates down the arm to the fingers\par Turning her head and raising her shoulder increases her right neck/arm pain\par hands feel weak.  She has not dropped objects.\par

## 2023-04-06 NOTE — DATA REVIEWED
[Plain X-Rays] : plain X-Rays [MRI] : MRI [FreeTextEntry1] : \par EMG/NCV of the upper extremities formed on June 20, 2019\par 1.	Normal EMG/NCV. Her’s cervical pain is not a confounding source of her right shoulder pain.\par \par RIGHT KNEE XRAY AP LATERAL AND OBLIQUE 3 VIEWS\par IMPRESSION: There are mild osteoarthritic degenerative changes. M17.11\par There are no fractures.\par \par Xray of the shoulder neg.\par Xray of the lumbar: mild arthritis.\par MRI of the cervical spine reveals thickening of the longitudinal ligament T1/T2  effacing the epidural space. Reversal of cervical lordosis\par Mri of the  right shoulder reveals low grade partial thickness tear of the inferior fibers of the infraspinatus tendon. Suspected SLAP tear of the labrum\par \par MRI of the brain without contrast performed on March 28, 2023 is negative\par \par X-ray of her cervical spine performed on March 8, 2023\par Minimal degenerative changes at C5/6 and  C6/7\par

## 2023-04-06 NOTE — PHYSICAL EXAM
[FreeTextEntry1] : Physical Exam\par \par General:\par     well developed, well nourished, in no acute distress.  \par Head:\par     normocephalic and atraumatic.  \par Eyes:\par     PERRL/EOM intact, conjunctiva and sclera clear with out nystagmus.  \par Ears:\par     TM's intact and clear with normal canals with grossly normal hearing.  \par Nose:\par     no deformity, discharge, inflammation, or lesions.  \par Mouth:\par     no deformity or lesions with good dentition.  \par Neck:\par    Spasm in the paracervical muscles R>L. negative Spurling's\par Chest Wall:\par     no deformities \par Lungs:\par     clear bilaterally to auscultation.  \par Heart:\par     non-displaced PMI, chest non-tender; regular rate and rhythm, S1, S2 without murmurs, rubs, or gallops\par Abdomen:\par      normal bowel sounds; no hepatosplenomegaly no ventral,umbilical hernias or masses noted.  \par Msk:\par      thoracic spine: Tenderness in the paraspinal muscles between the shoulder blades. \par Pulses:\par     pulses normal in all 4 extremities.  \par Extremities:\par     LUE: Shoulder flexion 0-100 abduction 0-100 MS 4-/5 mild tender on palpation of the traps\par Elbow FAROM, MS 4/5 reflexes 2/4 mild tender with pain on resisted wrist extension\par Wrist FAROM, MS 5/5 reflexes 2/4\par sensation is intact to light touch, pinprick and proprioception\par \par RUE: Shoulder flexion 120 abduction 0-120 MS 4-/5 + tender on palpation of the top of the shoulder and anteriorly\par Elbow FAROM, MS 4/5 reflexes 2/4\par Wrist FAROM, MS 5/5 reflexes 2/4\par sensation is intact to light touch, pinprick and proprioception\par \par LLE: Hip FAROM, MS 4/5 and on palpation of the hip bursa\par knee: FAROM, MS 5/5 Reflexes 2/4\par ankle: FAROM, MS 5/5 reflexes 2/4\par sensation is intact to light touch, pinprick and proprioception\par \par RLE: Hip FAROM, MS 4/5 and on palpation of the hip bursa\par knee: FAROM, MS 5/5 Reflexes 2/4\par ankle: FAROM, MS 5/5 reflexes 2/4\par sensation is intact to light touch, pinprick and proprioception\par \par Neurologic:\par     Gait: Spontaneous, reciprocal and safe without an assistive device \par \par

## 2023-04-10 LAB
25(OH)D3 SERPL-MCNC: 21.9 NG/ML
ALBUMIN SERPL ELPH-MCNC: 4.8 G/DL
ALP BLD-CCNC: 60 U/L
ALT SERPL-CCNC: 9 U/L
ANION GAP SERPL CALC-SCNC: 13 MMOL/L
APPEARANCE: CLEAR
AST SERPL-CCNC: 13 U/L
BASOPHILS # BLD AUTO: 0.06 K/UL
BASOPHILS NFR BLD AUTO: 0.6 %
BILIRUB SERPL-MCNC: 0.2 MG/DL
BILIRUBIN URINE: NEGATIVE
BLOOD URINE: NEGATIVE
BUN SERPL-MCNC: 17 MG/DL
CALCIUM SERPL-MCNC: 10.8 MG/DL
CHLORIDE SERPL-SCNC: 99 MMOL/L
CHOLEST SERPL-MCNC: 158 MG/DL
CO2 SERPL-SCNC: 26 MMOL/L
COLOR: YELLOW
CREAT SERPL-MCNC: 0.7 MG/DL
EGFR: 109 ML/MIN/1.73M2
EOSINOPHIL # BLD AUTO: 0.09 K/UL
EOSINOPHIL NFR BLD AUTO: 1 %
ESTIMATED AVERAGE GLUCOSE: 128 MG/DL
GLUCOSE QUALITATIVE U: NEGATIVE MG/DL
GLUCOSE SERPL-MCNC: 92 MG/DL
HBA1C MFR BLD HPLC: 6.1 %
HCG SERPL-MCNC: 1 MIU/ML
HCT VFR BLD CALC: 38.3 %
HDLC SERPL-MCNC: 67 MG/DL
HGB BLD-MCNC: 12.1 G/DL
IMM GRANULOCYTES NFR BLD AUTO: 0.2 %
KETONES URINE: NEGATIVE MG/DL
LDLC SERPL CALC-MCNC: 73 MG/DL
LEUKOCYTE ESTERASE URINE: NEGATIVE
LYMPHOCYTES # BLD AUTO: 2.61 K/UL
LYMPHOCYTES NFR BLD AUTO: 27.7 %
MAN DIFF?: NORMAL
MCHC RBC-ENTMCNC: 30.3 PG
MCHC RBC-ENTMCNC: 31.6 GM/DL
MCV RBC AUTO: 95.8 FL
MONOCYTES # BLD AUTO: 0.65 K/UL
MONOCYTES NFR BLD AUTO: 6.9 %
NEUTROPHILS # BLD AUTO: 5.99 K/UL
NEUTROPHILS NFR BLD AUTO: 63.6 %
NITRITE URINE: NEGATIVE
NONHDLC SERPL-MCNC: 91 MG/DL
PH URINE: 6
PLATELET # BLD AUTO: 328 K/UL
POTASSIUM SERPL-SCNC: 4.7 MMOL/L
PROT SERPL-MCNC: 7.6 G/DL
PROTEIN URINE: NORMAL MG/DL
RBC # BLD: 4 M/UL
RBC # FLD: 13.1 %
SODIUM SERPL-SCNC: 138 MMOL/L
SPECIFIC GRAVITY URINE: 1.02
TRIGL SERPL-MCNC: 91 MG/DL
TSH SERPL-ACNC: 1 UIU/ML
UROBILINOGEN URINE: 0.2 MG/DL
WBC # FLD AUTO: 9.42 K/UL

## 2023-04-12 PROBLEM — R73.03 PREDIABETES: Status: ACTIVE | Noted: 2023-04-12

## 2023-04-12 PROBLEM — N92.6 MISSED MENSES: Status: ACTIVE | Noted: 2023-04-05

## 2023-04-12 PROBLEM — R79.89 LOW VITAMIN D LEVEL: Status: ACTIVE | Noted: 2023-04-12

## 2023-04-12 PROBLEM — Z13.6 SCREENING FOR HEART DISEASE: Status: ACTIVE | Noted: 2023-04-05

## 2023-04-12 NOTE — HEALTH RISK ASSESSMENT
[No] : No [0] : 2) Feeling down, depressed, or hopeless: Not at all (0) [PHQ-2 Negative - No further assessment needed] : PHQ-2 Negative - No further assessment needed [Never] : Never [[id number]] : 0

## 2023-04-12 NOTE — REVIEW OF SYSTEMS
[Fatigue] : fatigue [Headache] : headache [Fever] : no fever [Chills] : no chills [Recent Change In Weight] : ~T no recent weight change [Vision Problems] : no vision problems [Earache] : no earache [Nasal Discharge] : no nasal discharge [Sore Throat] : no sore throat [Chest Pain] : no chest pain [Palpitations] : no palpitations [Shortness Of Breath] : no shortness of breath [Wheezing] : no wheezing [Abdominal Pain] : no abdominal pain [Nausea] : no nausea [Diarrhea] : diarrhea [Vomiting] : no vomiting [Heartburn] : no heartburn [Dysuria] : no dysuria [Hematuria] : no hematuria [Frequency] : no frequency [Joint Pain] : no joint pain [Joint Swelling] : no joint swelling [Skin Rash] : no skin rash [Dizziness] : no dizziness [Anxiety] : no anxiety [Depression] : no depression [Swollen Glands] : no swollen glands

## 2023-04-12 NOTE — HISTORY OF PRESENT ILLNESS
[FreeTextEntry1] : annual wellness [de-identified] : 46 y/o female presents for annual wellness exam. She is currently following neurology for migraines.She is on Qulipta and Ubrelvy. Patient is also requesting a pregnancy test due to a missed period.  She feels otherwise well and reports no other acute complaints or concerns. ROS as documented below.\par \par

## 2023-04-12 NOTE — END OF VISIT
[Time Spent: ___ minutes] : I have spent [unfilled] minutes of time on the encounter. [FreeTextEntry4] : I Rani Javed am scribing for and in the presence of Dr. Spencer Munguia, the following sections: HISTORY OF PRESENT ILLNESS, PAST MEDICAL/FAMILY/SOCIAL HISTORY, REVIEW OF SYSTEMS, PHYSICAL EXAM; DISPOSITION.\par \par I personally performed the services described in the documentation, reviewed the documentation recorded by the scribe in my presence and it accurately and completely records my words and actions.

## 2023-05-03 ENCOUNTER — APPOINTMENT (OUTPATIENT)
Dept: PHYSICAL MEDICINE AND REHAB | Facility: CLINIC | Age: 48
End: 2023-05-03

## 2023-05-30 ENCOUNTER — RX RENEWAL (OUTPATIENT)
Age: 48
End: 2023-05-30

## 2023-06-30 ENCOUNTER — APPOINTMENT (OUTPATIENT)
Dept: RHEUMATOLOGY | Facility: CLINIC | Age: 48
End: 2023-06-30

## 2023-07-03 ENCOUNTER — RX RENEWAL (OUTPATIENT)
Age: 48
End: 2023-07-03

## 2023-07-05 ENCOUNTER — APPOINTMENT (OUTPATIENT)
Dept: INTERNAL MEDICINE | Facility: CLINIC | Age: 48
End: 2023-07-05

## 2023-07-10 ENCOUNTER — RX RENEWAL (OUTPATIENT)
Age: 48
End: 2023-07-10

## 2023-07-10 RX ORDER — HYDROCHLOROTHIAZIDE 25 MG/1
25 TABLET ORAL
Qty: 90 | Refills: 3 | Status: ACTIVE | COMMUNITY
Start: 2022-06-01 | End: 1900-01-01

## 2023-07-13 ENCOUNTER — RX RENEWAL (OUTPATIENT)
Age: 48
End: 2023-07-13

## 2023-07-25 ENCOUNTER — RX RENEWAL (OUTPATIENT)
Age: 48
End: 2023-07-25

## 2023-07-28 ENCOUNTER — RX RENEWAL (OUTPATIENT)
Age: 48
End: 2023-07-28

## 2023-08-08 ENCOUNTER — APPOINTMENT (OUTPATIENT)
Dept: INTERNAL MEDICINE | Facility: CLINIC | Age: 48
End: 2023-08-08
Payer: COMMERCIAL

## 2023-08-08 VITALS
TEMPERATURE: 98.2 F | RESPIRATION RATE: 18 BRPM | WEIGHT: 193 LBS | HEART RATE: 97 BPM | BODY MASS INDEX: 31.02 KG/M2 | OXYGEN SATURATION: 99 % | HEIGHT: 66 IN | DIASTOLIC BLOOD PRESSURE: 84 MMHG | SYSTOLIC BLOOD PRESSURE: 124 MMHG

## 2023-08-08 DIAGNOSIS — R09.81 NASAL CONGESTION: ICD-10-CM

## 2023-08-08 DIAGNOSIS — Z20.822 CONTACT WITH AND (SUSPECTED) EXPOSURE TO COVID-19: ICD-10-CM

## 2023-08-08 PROCEDURE — 99213 OFFICE O/P EST LOW 20 MIN: CPT

## 2023-08-09 LAB
RAPID RVP RESULT: NOT DETECTED
SARS-COV-2 RNA PNL RESP NAA+PROBE: NOT DETECTED

## 2023-08-10 ENCOUNTER — APPOINTMENT (OUTPATIENT)
Dept: INTERNAL MEDICINE | Facility: CLINIC | Age: 48
End: 2023-08-10
Payer: COMMERCIAL

## 2023-08-10 PROCEDURE — 99211 OFF/OP EST MAY X REQ PHY/QHP: CPT

## 2023-08-11 LAB
RAPID RVP RESULT: NOT DETECTED
SARS-COV-2 RNA PNL RESP NAA+PROBE: NOT DETECTED

## 2023-08-18 PROBLEM — R09.81 SINUS CONGESTION: Status: ACTIVE | Noted: 2023-08-18

## 2023-08-18 NOTE — END OF VISIT
[FreeTextEntry4] : I Rani Javed am scribing for and in the presence of Dr. Spencer Munguia, the following sections: HISTORY OF PRESENT ILLNESS, PAST MEDICAL/FAMILY/SOCIAL HISTORY, REVIEW OF SYSTEMS, PHYSICAL EXAM; DISPOSITION.  I personally performed the services described in the documentation, reviewed the documentation recorded by the scribe in my presence and it accurately and completely records my words and actions.

## 2023-08-18 NOTE — HISTORY OF PRESENT ILLNESS
[FreeTextEntry1] : Sick Visit [de-identified] : 49 y/o female presents for sinus congestion and COVID testing due to recent known covid exposure. She feels otherwise well and reports no other acute complaints or concerns. ROS as documented below.

## 2023-08-18 NOTE — REVIEW OF SYSTEMS
[Headache] : headache [Fever] : no fever [Chills] : no chills [Recent Change In Weight] : ~T no recent weight change [Vision Problems] : no vision problems [Earache] : no earache [Nasal Discharge] : nasal discharge [Sore Throat] : no sore throat [Postnasal Drip] : postnasal drip [Chest Pain] : no chest pain [Palpitations] : no palpitations [Shortness Of Breath] : no shortness of breath [Wheezing] : no wheezing [Abdominal Pain] : no abdominal pain [Nausea] : no nausea [Diarrhea] : diarrhea [Vomiting] : no vomiting [Heartburn] : no heartburn [Dysuria] : no dysuria [Hematuria] : no hematuria [Frequency] : no frequency [Joint Pain] : no joint pain [Joint Swelling] : no joint swelling [Skin Rash] : no skin rash [Dizziness] : no dizziness [Anxiety] : no anxiety [Depression] : no depression [Swollen Glands] : no swollen glands

## 2023-08-25 ENCOUNTER — EMERGENCY (EMERGENCY)
Facility: HOSPITAL | Age: 48
LOS: 1 days | Discharge: HOME | End: 2023-08-25
Attending: EMERGENCY MEDICINE | Admitting: EMERGENCY MEDICINE
Payer: COMMERCIAL

## 2023-08-25 VITALS
TEMPERATURE: 98 F | OXYGEN SATURATION: 97 % | DIASTOLIC BLOOD PRESSURE: 58 MMHG | RESPIRATION RATE: 17 BRPM | SYSTOLIC BLOOD PRESSURE: 109 MMHG | HEART RATE: 100 BPM

## 2023-08-25 VITALS
OXYGEN SATURATION: 100 % | RESPIRATION RATE: 16 BRPM | SYSTOLIC BLOOD PRESSURE: 143 MMHG | HEART RATE: 93 BPM | DIASTOLIC BLOOD PRESSURE: 100 MMHG

## 2023-08-25 LAB
ALBUMIN SERPL ELPH-MCNC: 4.7 G/DL — SIGNIFICANT CHANGE UP (ref 3.3–5)
ALP SERPL-CCNC: 56 U/L — SIGNIFICANT CHANGE UP (ref 40–120)
ALT FLD-CCNC: 11 U/L — SIGNIFICANT CHANGE UP (ref 4–33)
ANION GAP SERPL CALC-SCNC: 9 MMOL/L — SIGNIFICANT CHANGE UP (ref 7–14)
APPEARANCE UR: ABNORMAL
AST SERPL-CCNC: 16 U/L — SIGNIFICANT CHANGE UP (ref 4–32)
BACTERIA # UR AUTO: ABNORMAL /HPF
BASOPHILS # BLD AUTO: 0.03 K/UL — SIGNIFICANT CHANGE UP (ref 0–0.2)
BASOPHILS NFR BLD AUTO: 0.2 % — SIGNIFICANT CHANGE UP (ref 0–2)
BILIRUB SERPL-MCNC: 0.4 MG/DL — SIGNIFICANT CHANGE UP (ref 0.2–1.2)
BILIRUB UR-MCNC: ABNORMAL
BUN SERPL-MCNC: 15 MG/DL — SIGNIFICANT CHANGE UP (ref 7–23)
CALCIUM SERPL-MCNC: 10.3 MG/DL — SIGNIFICANT CHANGE UP (ref 8.4–10.5)
CHLORIDE SERPL-SCNC: 100 MMOL/L — SIGNIFICANT CHANGE UP (ref 98–107)
CO2 SERPL-SCNC: 33 MMOL/L — HIGH (ref 22–31)
COLOR SPEC: SIGNIFICANT CHANGE UP
COMMENT - URINE: SIGNIFICANT CHANGE UP
CREAT SERPL-MCNC: 0.79 MG/DL — SIGNIFICANT CHANGE UP (ref 0.5–1.3)
DIFF PNL FLD: ABNORMAL
EGFR: 93 ML/MIN/1.73M2 — SIGNIFICANT CHANGE UP
EOSINOPHIL # BLD AUTO: 0.03 K/UL — SIGNIFICANT CHANGE UP (ref 0–0.5)
EOSINOPHIL NFR BLD AUTO: 0.2 % — SIGNIFICANT CHANGE UP (ref 0–6)
EPI CELLS # UR: PRESENT
GLUCOSE SERPL-MCNC: 98 MG/DL — SIGNIFICANT CHANGE UP (ref 70–99)
GLUCOSE UR QL: NEGATIVE MG/DL — SIGNIFICANT CHANGE UP
HCG UR QL: NEGATIVE — SIGNIFICANT CHANGE UP
HCT VFR BLD CALC: 37.4 % — SIGNIFICANT CHANGE UP (ref 34.5–45)
HGB BLD-MCNC: 12.3 G/DL — SIGNIFICANT CHANGE UP (ref 11.5–15.5)
IANC: 10.58 K/UL — HIGH (ref 1.8–7.4)
IMM GRANULOCYTES NFR BLD AUTO: 0.2 % — SIGNIFICANT CHANGE UP (ref 0–0.9)
KETONES UR-MCNC: ABNORMAL MG/DL
LEUKOCYTE ESTERASE UR-ACNC: NEGATIVE — SIGNIFICANT CHANGE UP
LYMPHOCYTES # BLD AUTO: 1.08 K/UL — SIGNIFICANT CHANGE UP (ref 1–3.3)
LYMPHOCYTES # BLD AUTO: 8.9 % — LOW (ref 13–44)
MCHC RBC-ENTMCNC: 29.8 PG — SIGNIFICANT CHANGE UP (ref 27–34)
MCHC RBC-ENTMCNC: 32.9 GM/DL — SIGNIFICANT CHANGE UP (ref 32–36)
MCV RBC AUTO: 90.6 FL — SIGNIFICANT CHANGE UP (ref 80–100)
MONOCYTES # BLD AUTO: 0.39 K/UL — SIGNIFICANT CHANGE UP (ref 0–0.9)
MONOCYTES NFR BLD AUTO: 3.2 % — SIGNIFICANT CHANGE UP (ref 2–14)
NEUTROPHILS # BLD AUTO: 10.58 K/UL — HIGH (ref 1.8–7.4)
NEUTROPHILS NFR BLD AUTO: 87.3 % — HIGH (ref 43–77)
NITRITE UR-MCNC: NEGATIVE — SIGNIFICANT CHANGE UP
NRBC # BLD: 0 /100 WBCS — SIGNIFICANT CHANGE UP (ref 0–0)
NRBC # FLD: 0 K/UL — SIGNIFICANT CHANGE UP (ref 0–0)
PH UR: 5.5 — SIGNIFICANT CHANGE UP (ref 5–8)
PLATELET # BLD AUTO: 340 K/UL — SIGNIFICANT CHANGE UP (ref 150–400)
POTASSIUM SERPL-MCNC: 3.9 MMOL/L — SIGNIFICANT CHANGE UP (ref 3.5–5.3)
POTASSIUM SERPL-SCNC: 3.9 MMOL/L — SIGNIFICANT CHANGE UP (ref 3.5–5.3)
PROT SERPL-MCNC: 8.4 G/DL — HIGH (ref 6–8.3)
PROT UR-MCNC: 100 MG/DL
RBC # BLD: 4.13 M/UL — SIGNIFICANT CHANGE UP (ref 3.8–5.2)
RBC # FLD: 12.2 % — SIGNIFICANT CHANGE UP (ref 10.3–14.5)
RBC CASTS # UR COMP ASSIST: 5 /HPF — HIGH (ref 0–4)
SODIUM SERPL-SCNC: 142 MMOL/L — SIGNIFICANT CHANGE UP (ref 135–145)
SP GR SPEC: 1.03 — HIGH (ref 1–1.03)
UROBILINOGEN FLD QL: 1 MG/DL — SIGNIFICANT CHANGE UP (ref 0.2–1)
WBC # BLD: 12.14 K/UL — HIGH (ref 3.8–10.5)
WBC # FLD AUTO: 12.14 K/UL — HIGH (ref 3.8–10.5)
WBC UR QL: 2 /HPF — SIGNIFICANT CHANGE UP (ref 0–5)

## 2023-08-25 PROCEDURE — 74019 RADEX ABDOMEN 2 VIEWS: CPT | Mod: 26

## 2023-08-25 PROCEDURE — 99284 EMERGENCY DEPT VISIT MOD MDM: CPT

## 2023-08-25 PROCEDURE — 93010 ELECTROCARDIOGRAM REPORT: CPT

## 2023-08-25 RX ORDER — SODIUM CHLORIDE 9 MG/ML
1000 INJECTION INTRAMUSCULAR; INTRAVENOUS; SUBCUTANEOUS ONCE
Refills: 0 | Status: COMPLETED | OUTPATIENT
Start: 2023-08-25 | End: 2023-08-25

## 2023-08-25 RX ORDER — FAMOTIDINE 10 MG/ML
20 INJECTION INTRAVENOUS ONCE
Refills: 0 | Status: COMPLETED | OUTPATIENT
Start: 2023-08-25 | End: 2023-08-25

## 2023-08-25 RX ORDER — ACETAMINOPHEN 500 MG
1000 TABLET ORAL ONCE
Refills: 0 | Status: DISCONTINUED | OUTPATIENT
Start: 2023-08-25 | End: 2023-08-28

## 2023-08-25 RX ORDER — ACETAMINOPHEN 500 MG
1000 TABLET ORAL ONCE
Refills: 0 | Status: COMPLETED | OUTPATIENT
Start: 2023-08-25 | End: 2023-08-25

## 2023-08-25 RX ORDER — ONDANSETRON 8 MG/1
4 TABLET, FILM COATED ORAL ONCE
Refills: 0 | Status: COMPLETED | OUTPATIENT
Start: 2023-08-25 | End: 2023-08-25

## 2023-08-25 RX ORDER — ONDANSETRON 8 MG/1
1 TABLET, FILM COATED ORAL
Qty: 12 | Refills: 0
Start: 2023-08-25 | End: 2023-08-27

## 2023-08-25 RX ORDER — KETOROLAC TROMETHAMINE 30 MG/ML
15 SYRINGE (ML) INJECTION ONCE
Refills: 0 | Status: DISCONTINUED | OUTPATIENT
Start: 2023-08-25 | End: 2023-08-25

## 2023-08-25 RX ORDER — METOCLOPRAMIDE HCL 10 MG
10 TABLET ORAL ONCE
Refills: 0 | Status: COMPLETED | OUTPATIENT
Start: 2023-08-25 | End: 2023-08-25

## 2023-08-25 RX ADMIN — ONDANSETRON 4 MILLIGRAM(S): 8 TABLET, FILM COATED ORAL at 10:15

## 2023-08-25 RX ADMIN — SODIUM CHLORIDE 1000 MILLILITER(S): 9 INJECTION INTRAMUSCULAR; INTRAVENOUS; SUBCUTANEOUS at 13:03

## 2023-08-25 RX ADMIN — SODIUM CHLORIDE 1000 MILLILITER(S): 9 INJECTION INTRAMUSCULAR; INTRAVENOUS; SUBCUTANEOUS at 10:15

## 2023-08-25 RX ADMIN — ONDANSETRON 4 MILLIGRAM(S): 8 TABLET, FILM COATED ORAL at 14:43

## 2023-08-25 RX ADMIN — Medication 15 MILLIGRAM(S): at 13:03

## 2023-08-25 RX ADMIN — FAMOTIDINE 20 MILLIGRAM(S): 10 INJECTION INTRAVENOUS at 13:04

## 2023-08-25 RX ADMIN — Medication 1000 MILLIGRAM(S): at 10:57

## 2023-08-25 RX ADMIN — Medication 10 MILLIGRAM(S): at 15:24

## 2023-08-25 RX ADMIN — Medication 400 MILLIGRAM(S): at 10:17

## 2023-08-25 NOTE — ED PROVIDER NOTE - CLINICAL SUMMARY MEDICAL DECISION MAKING FREE TEXT BOX
49 y/o F with PMHx of migraines and HTN presents to ED c/o nausea, vomiting and decreased PO intake x 5 days, also with HA, general fatigue. 2 weeks ago had dose increase of Semaglutide, GI upset immediately after dose increase, but symptoms increased in severity this week. Patient appears uncomfortable, with dry mm, no focal abdominal tenderness. Suspect symptoms are related to medication, though will check urine, basic labs, give IV fluids and zofran for symptomatic relief. If patient feels better with medication, will PO trial and d/c home with PCP f/u.

## 2023-08-25 NOTE — ED PROVIDER NOTE - PHYSICAL EXAMINATION
Gen: well appearing, in no acute distress   Head: normal appearing  HEENT: normal conjunctiva, oral mucosa dry, vision grossly intact   Lung: no respiratory distress, speaking in full sentences, CTA b/l, no wheeze, crackles or rhonchi   CV: regular rate and rhythm, no murmurs  Abd: soft, non distended, no focal tenderness   MSK: no visible deformities, ambulating without difficulty   Neuro: No focal deficits, AAOx3  Skin: Warm, no rashes   Psych: normal affect

## 2023-08-25 NOTE — ED PROVIDER NOTE - NSFOLLOWUPINSTRUCTIONS_ED_ALL_ED_FT
Advance activity as tolerated.  Take prescribed medications as directed unless otherwise instructed.  Make sure that you drink plenty of fluids as well. Follow up with your primary care physician in 48-72 hours- bring copies of your results.  Return to the ER for worsening or persistent symptoms, and/or ANY NEW OR CONCERNING SYMPTOMS. If you have issues obtaining follow up, please call: [phone] to obtain a doctor or specialist who takes your insurance in your area.  You may call [phone] to make an appointment with the internal medicine clinic.

## 2023-08-25 NOTE — ED ADULT NURSE NOTE - NS ED NOTE ABUSE SUSPICION NEGLECT YN
No Sigue con el doctor de los juntos/articulaciones (reumatologo).     Althea las pastillas en la receta.    Regresa si el dolor es demasiado aunque althea las medicinas. Sigue con el doctor de los juntos/articulaciones (orthopedico) Call [phone] para bairon nurys.    Althea las pastillas en la receta.    Regresa si el dolor es demasiado aunque althea las medicinas.

## 2023-08-25 NOTE — ED ADULT NURSE NOTE - NSFALLUNIVINTERV_ED_ALL_ED
Bed/Stretcher in lowest position, wheels locked, appropriate side rails in place/Call bell, personal items and telephone in reach/Instruct patient to call for assistance before getting out of bed/chair/stretcher/Non-slip footwear applied when patient is off stretcher/[place] to call system/Physically safe environment - no spills, clutter or unnecessary equipment/Purposeful proactive rounding/Room/bathroom lighting operational, light cord in reach

## 2023-08-25 NOTE — ED PROVIDER NOTE - OBJECTIVE STATEMENT
49 y/o F with PMHx of migraines and HTN presents to ED c/o nausea, vomiting and decreased PO intake x 5 days. Associated feeling of weakness and headache, which she attributes to not eating much this week. Mild constipation (small volume stools throughout week), still passing gas as of yesterday. She is currently on the weight loss medication Semaglutide, and had a dose increase from 10 to 20 about 2 weeks ago. Had mild GI upset with the dose increase, but symptoms didn't become severe until this week. Denies fevers, chills, chest pain, difficulty breathing, dysuria, hematuria, vaginal bleeding or flank pain.

## 2023-08-25 NOTE — ED ADULT TRIAGE NOTE - ARRIVAL FROM
Encounter created to convert existing Care Management episode to new RN Care Coordination Program Episode.    
Home

## 2023-08-25 NOTE — ED PROVIDER NOTE - PATIENT PORTAL LINK FT
You can access the FollowMyHealth Patient Portal offered by [facility] by registering at the following website: [email or web address]. By joining [employer]’s FollowMyHealth portal, you will also be able to view your health information using other applications (apps) compatible with our system.

## 2023-08-25 NOTE — ED ADULT NURSE NOTE - OBJECTIVE STATEMENT
pt received to intake. Pt is AxO 3 and ambulatory. pt c/o of diffuse abdominal pain with n/v/. pt states the symptoms began when her provider increased her dose of weight loss injection. Pt denies blood in emesis. pt endorses severe migraine and elevated BP. pt was unable to take at home medication due to the n/v. Pt denies chest pain, SOB, fevers. labs obtained and sent to the lab. Pt medicated as ordered. Plan of care ongoing, safety maintained.

## 2023-08-27 LAB
CULTURE RESULTS: SIGNIFICANT CHANGE UP
SPECIMEN SOURCE: SIGNIFICANT CHANGE UP

## 2023-11-14 ENCOUNTER — RX RENEWAL (OUTPATIENT)
Age: 48
End: 2023-11-14

## 2023-11-14 RX ORDER — DULOXETINE HYDROCHLORIDE 20 MG/1
20 CAPSULE, DELAYED RELEASE PELLETS ORAL DAILY
Qty: 90 | Refills: 3 | Status: ACTIVE | COMMUNITY
Start: 2022-06-23 | End: 1900-01-01

## 2023-12-18 NOTE — ED ADULT TRIAGE NOTE - BP NONINVASIVE DIASTOLIC (MM HG)
46yPatient is a 46y old  Female who presents with a chief complaint of L shoulder and chest pain (18 Dec 2023 09:19)      Interval history:  Afebrile, hungry, awaiting vac change in OR.       Allergies:   No Known Allergies      Antimicrobials:  cefepime   IVPB 2000 milliGRAM(s) IV Intermittent every 8 hours      REVIEW OF SYSTEMS:  No SOB  No abdominal pain  No rash.       Vital Signs Last 24 Hrs  T(C): 36.4 (12-18-23 @ 15:11), Max: 36.7 (12-17-23 @ 17:06)  T(F): 97.6 (12-18-23 @ 05:18), Max: 98.1 (12-17-23 @ 17:06)  HR: 80 (12-18-23 @ 15:11) (80 - 88)  BP: 118/75 (12-18-23 @ 15:11) (100/65 - 135/75)  BP(mean): 74 (12-18-23 @ 15:11) (74 - 74)  RR: 18 (12-18-23 @ 15:11) (18 - 18)  SpO2: 97% (12-18-23 @ 15:11) (96% - 99%)      PHYSICAL EXAM:  Pt in no acute distress, alert, awake.   non distended abdomen  no edema LE   no phlebitis   Wound vac in place left shoulder                              8.3    12.51 )-----------( 404      ( 18 Dec 2023 12:00 )             26.7               Culture - Fungal, Tissue (collected 16 Dec 2023 03:41)  Source: .Tissue Other  Preliminary Report (18 Dec 2023 07:58):    Testing in progress    Culture - Tissue with Gram Stain (collected 16 Dec 2023 03:41)  Source: .Tissue Other  Gram Stain (16 Dec 2023 22:11):    No polymorphonuclear cells seen per low power field    No organisms seen per oil power field  Preliminary Report (17 Dec 2023 08:02):    No growth    Culture - Fungal, Tissue (collected 16 Dec 2023 03:38)  Source: .Tissue Other  Preliminary Report (18 Dec 2023 07:58):    Testing in progress    Culture - Tissue with Gram Stain (collected 16 Dec 2023 03:38)  Source: .Tissue Other  Gram Stain (17 Dec 2023 13:27):    No polymorphonuclear cells seen per low power field    No organisms seen per oil power field  Preliminary Report (17 Dec 2023 13:27):    No growth    Culture - Tissue with Gram Stain (collected 16 Dec 2023 03:30)  Source: .Tissue Other  Gram Stain (16 Dec 2023 22:15):    No polymorphonuclear cells seen per low power field    No organisms seen per oil power field  Preliminary Report (17 Dec 2023 08:00):    No growth        Radiology:    < from: Xray Chest 1 View AP/PA (12.16.23 @ 02:42) >  IMPRESSION:  No pneumothorax. Lungs are clear.         100

## 2024-01-08 ENCOUNTER — APPOINTMENT (OUTPATIENT)
Dept: INTERNAL MEDICINE | Facility: CLINIC | Age: 49
End: 2024-01-08
Payer: COMMERCIAL

## 2024-01-08 VITALS
SYSTOLIC BLOOD PRESSURE: 138 MMHG | HEIGHT: 66 IN | HEART RATE: 88 BPM | WEIGHT: 180 LBS | OXYGEN SATURATION: 97 % | TEMPERATURE: 97.8 F | DIASTOLIC BLOOD PRESSURE: 84 MMHG | RESPIRATION RATE: 18 BRPM | BODY MASS INDEX: 28.93 KG/M2

## 2024-01-08 DIAGNOSIS — D64.9 ANEMIA, UNSPECIFIED: ICD-10-CM

## 2024-01-08 DIAGNOSIS — R11.2 NAUSEA WITH VOMITING, UNSPECIFIED: ICD-10-CM

## 2024-01-08 DIAGNOSIS — I10 ESSENTIAL (PRIMARY) HYPERTENSION: ICD-10-CM

## 2024-01-08 DIAGNOSIS — E83.52 HYPERCALCEMIA: ICD-10-CM

## 2024-01-08 PROCEDURE — 99214 OFFICE O/P EST MOD 30 MIN: CPT

## 2024-01-08 PROCEDURE — G2211 COMPLEX E/M VISIT ADD ON: CPT

## 2024-01-08 PROCEDURE — 36415 COLL VENOUS BLD VENIPUNCTURE: CPT

## 2024-01-08 RX ORDER — ONDANSETRON 4 MG/1
4 TABLET, ORALLY DISINTEGRATING ORAL DAILY
Qty: 30 | Refills: 0 | Status: ACTIVE | COMMUNITY
Start: 2024-01-08 | End: 1900-01-01

## 2024-01-08 RX ORDER — OMEPRAZOLE 20 MG/1
20 CAPSULE, DELAYED RELEASE ORAL
Qty: 30 | Refills: 0 | Status: ACTIVE | COMMUNITY
Start: 2024-01-08 | End: 1900-01-01

## 2024-01-19 LAB
ALBUMIN SERPL ELPH-MCNC: 4.1 G/DL
ALP BLD-CCNC: 47 U/L
ALT SERPL-CCNC: 11 U/L
ANION GAP SERPL CALC-SCNC: 10 MMOL/L
AST SERPL-CCNC: 14 U/L
BASOPHILS # BLD AUTO: 0.04 K/UL
BASOPHILS NFR BLD AUTO: 0.5 %
BILIRUB SERPL-MCNC: 0.4 MG/DL
BUN SERPL-MCNC: 14 MG/DL
CALCIUM SERPL-MCNC: 9.9 MG/DL
CHLORIDE SERPL-SCNC: 101 MMOL/L
CO2 SERPL-SCNC: 28 MMOL/L
CREAT SERPL-MCNC: 0.76 MG/DL
EGFR: 98 ML/MIN/1.73M2
EOSINOPHIL # BLD AUTO: 0.1 K/UL
EOSINOPHIL NFR BLD AUTO: 1.4 %
ESTIMATED AVERAGE GLUCOSE: 114 MG/DL
GLUCOSE SERPL-MCNC: 90 MG/DL
HBA1C MFR BLD HPLC: 5.6 %
HCT VFR BLD CALC: 34.7 %
HGB BLD-MCNC: 11.4 G/DL
IMM GRANULOCYTES NFR BLD AUTO: 0.1 %
LYMPHOCYTES # BLD AUTO: 2.09 K/UL
LYMPHOCYTES NFR BLD AUTO: 28.6 %
MAN DIFF?: NORMAL
MCHC RBC-ENTMCNC: 30.6 PG
MCHC RBC-ENTMCNC: 32.9 GM/DL
MCV RBC AUTO: 93 FL
MONOCYTES # BLD AUTO: 0.52 K/UL
MONOCYTES NFR BLD AUTO: 7.1 %
NEUTROPHILS # BLD AUTO: 4.54 K/UL
NEUTROPHILS NFR BLD AUTO: 62.3 %
PLATELET # BLD AUTO: 346 K/UL
POTASSIUM SERPL-SCNC: 4.1 MMOL/L
PROT SERPL-MCNC: 7.2 G/DL
RAPID RVP RESULT: NOT DETECTED
RBC # BLD: 3.73 M/UL
RBC # FLD: 12.1 %
SARS-COV-2 RNA PNL RESP NAA+PROBE: NOT DETECTED
SODIUM SERPL-SCNC: 139 MMOL/L
WBC # FLD AUTO: 7.3 K/UL

## 2024-01-28 PROBLEM — E83.52 HYPERCALCEMIA: Status: ACTIVE | Noted: 2023-04-12

## 2024-01-28 PROBLEM — I10 HYPERTENSION: Status: ACTIVE | Noted: 2022-02-21

## 2024-01-28 PROBLEM — D64.9 ANEMIA, UNSPECIFIED TYPE: Status: ACTIVE | Noted: 2024-01-28

## 2024-01-28 PROBLEM — R11.2 NAUSEA AND VOMITING: Status: ACTIVE | Noted: 2024-01-08

## 2024-01-28 NOTE — HISTORY OF PRESENT ILLNESS
[FreeTextEntry1] : dizziness / flu like symptoms  [de-identified] : 47 y/o F presents with c/o dizziness and flu like symptoms for the past 5 days. RVP pending.  Follow up labs done for elevated calcium level, adding PTH level as well.  She feels otherwise well and reports no other acute complaints or concerns. ROS as documented below.  I Kaila Bancroft am scribing for and in the presence of Dr. Spencer Munguia, the following sections: HISTORY OF PRESENT ILLNESS, PAST MEDICAL/FAMILY/SOCIAL HISTORY, REVIEW OF SYSTEMS, PHYSICAL EXAM; DISPOSITION.  I personally performed the services described in the documentation, reviewed the documentation recorded by the scribe in my presence and it accurately and completely records my words and actions.

## 2024-02-15 ENCOUNTER — APPOINTMENT (OUTPATIENT)
Dept: PHYSICAL MEDICINE AND REHAB | Facility: CLINIC | Age: 49
End: 2024-02-15
Payer: COMMERCIAL

## 2024-02-15 VITALS
DIASTOLIC BLOOD PRESSURE: 80 MMHG | OXYGEN SATURATION: 99 % | SYSTOLIC BLOOD PRESSURE: 130 MMHG | RESPIRATION RATE: 17 BRPM | TEMPERATURE: 97.6 F | HEART RATE: 86 BPM | HEIGHT: 66 IN

## 2024-02-15 PROCEDURE — 99203 OFFICE O/P NEW LOW 30 MIN: CPT

## 2024-02-15 RX ORDER — NAPROXEN 500 MG/1
500 TABLET ORAL TWICE DAILY
Qty: 60 | Refills: 0 | Status: ACTIVE | COMMUNITY
Start: 2024-02-15 | End: 1900-01-01

## 2024-02-15 RX ORDER — METHYLPREDNISOLONE 4 MG/1
4 TABLET ORAL
Qty: 1 | Refills: 0 | Status: ACTIVE | COMMUNITY
Start: 2024-02-15 | End: 1900-01-01

## 2024-02-16 ENCOUNTER — RX RENEWAL (OUTPATIENT)
Age: 49
End: 2024-02-16

## 2024-02-16 RX ORDER — NAPROXEN 375 MG/1
375 TABLET ORAL
Qty: 180 | Refills: 3 | Status: ACTIVE | COMMUNITY
Start: 2023-03-01 | End: 1900-01-01

## 2024-02-18 NOTE — DATA REVIEWED
[Plain X-Rays] : plain X-Rays [MRI] : MRI [FreeTextEntry1] : \par  EMG/NCV of the upper extremities formed on June 20, 2019\par  1.	Normal EMG/NCV. Her's cervical pain is not a confounding source of her right shoulder pain.\par  \par  RIGHT KNEE XRAY AP LATERAL AND OBLIQUE 3 VIEWS\par  IMPRESSION: There are mild osteoarthritic degenerative changes. M17.11\par  There are no fractures.\par  \par  Xray of the shoulder neg.\par  Xray of the lumbar: mild arthritis.\par  MRI of the cervical spine reveals thickening of the longitudinal ligament T1/T2  effacing the epidural space. Reversal of cervical lordosis\par  Mri of the  right shoulder reveals low grade partial thickness tear of the inferior fibers of the infraspinatus tendon. Suspected SLAP tear of the labrum\par  \par  MRI of the brain without contrast performed on March 28, 2023 is negative\par  \par  X-ray of her cervical spine performed on March 8, 2023\par  Minimal degenerative changes at C5/6 and  C6/7\par

## 2024-02-18 NOTE — PHYSICAL EXAM
[FreeTextEntry1] : Physical Exam  General:     well developed, well nourished, in no acute distress.   Head:     normocephalic and atraumatic.   Eyes:     PERRL/EOM intact, conjunctiva and sclera clear with out nystagmus.   Ears:     TM's intact and clear with normal canals with grossly normal hearing.   Nose:     no deformity, discharge, inflammation, or lesions.   Mouth:     no deformity or lesions with good dentition.   Neck:    Spasm in the paracervical muscles R>L. negative Spurling's Chest Wall:     no deformities  Lungs:     clear bilaterally to auscultation.   Heart:     non-displaced PMI, chest non-tender; regular rate and rhythm, S1, S2 without murmurs, rubs, or gallops Abdomen:      normal bowel sounds; no hepatosplenomegaly no ventral,umbilical hernias or masses noted.   Msk:      thoracic spine: Tenderness in the paraspinal muscles between the shoulder blades.  Pulses:     pulses normal in all 4 extremities.   Extremities:     LUE: Shoulder flexion 0-100 abduction 0-100 MS 4-/5 mild tender on palpation of the traps Elbow FAROM, MS 4/5 reflexes 2/4 mild tender with pain on resisted wrist extension Wrist FAROM, MS 5/5 reflexes 2/4 sensation is intact to light touch, pinprick and proprioception  RUE: Shoulder flexion 120 abduction 0-120 MS 4-/5 + tender on palpation of the top of the shoulder and anteriorly Elbow FAROM, MS 4/5 reflexes 2/4 Wrist FAROM, MS 5/5 reflexes 2/4 sensation is intact to light touch, pinprick and proprioception  LLE: Hip FAROM, MS 4/5 Very tender on palpation of the hip bursa knee: FAROM, MS 5/5 Reflexes 2/4 ankle: FAROM, MS 5/5 reflexes 2/4 sensation is intact to light touch, pinprick and proprioception  RLE: Hip FAROM, MS 4/5 Very tender palpation of the hip bursa knee: FAROM, MS 5/5 Reflexes 2/4 ankle: FAROM, MS 5/5 reflexes 2/4 sensation is intact to light touch, pinprick and proprioception  Neurologic:     Gait: Spontaneous, reciprocal and safe without an assistive device

## 2024-02-18 NOTE — HISTORY OF PRESENT ILLNESS
[FreeTextEntry1] : 48-year-old female Is followed in the office for diffuse joint pain. Presents with an exacerbation of bilateral hip pain. She has bilateral hip pain for 3 months  She denies overuse  Bilateral hip pain Pain:  8/10 Worse: 10/10 Quality: sharp  Frequency: constant Starts and lateral aspect of both hips.  The pain radiates toward the back.  The pain is worse with sitting more than 20 minutes of standing more than 20 minutes or even laying at night on either side of her body. He denies leg weakness. She denies bowel bladder difficulties.

## 2024-02-25 ENCOUNTER — RX RENEWAL (OUTPATIENT)
Age: 49
End: 2024-02-25

## 2024-02-25 RX ORDER — GABAPENTIN 100 MG/1
100 CAPSULE ORAL 4 TIMES DAILY
Qty: 180 | Refills: 3 | Status: ACTIVE | COMMUNITY
Start: 2023-03-01 | End: 1900-01-01

## 2024-02-28 RX ORDER — LABETALOL HYDROCHLORIDE 200 MG/1
200 TABLET, FILM COATED ORAL
Qty: 180 | Refills: 3 | Status: ACTIVE | COMMUNITY
Start: 2023-07-13 | End: 1900-01-01

## 2024-03-18 ENCOUNTER — APPOINTMENT (OUTPATIENT)
Dept: PHYSICAL MEDICINE AND REHAB | Facility: CLINIC | Age: 49
End: 2024-03-18

## 2024-05-15 ENCOUNTER — APPOINTMENT (OUTPATIENT)
Dept: PHYSICAL MEDICINE AND REHAB | Facility: CLINIC | Age: 49
End: 2024-05-15
Payer: COMMERCIAL

## 2024-05-15 VITALS
HEART RATE: 74 BPM | TEMPERATURE: 97.5 F | RESPIRATION RATE: 17 BRPM | DIASTOLIC BLOOD PRESSURE: 64 MMHG | WEIGHT: 168 LBS | SYSTOLIC BLOOD PRESSURE: 118 MMHG | HEIGHT: 66 IN | OXYGEN SATURATION: 99 % | BODY MASS INDEX: 27 KG/M2

## 2024-05-15 DIAGNOSIS — M47.812 SPONDYLOSIS W/OUT MYELOPATHY OR RADICULOPATHY, CERVICAL REGION: ICD-10-CM

## 2024-05-15 DIAGNOSIS — M70.61 TROCHANTERIC BURSITIS, RIGHT HIP: ICD-10-CM

## 2024-05-15 DIAGNOSIS — M70.62 TROCHANTERIC BURSITIS, RIGHT HIP: ICD-10-CM

## 2024-05-15 PROCEDURE — 99213 OFFICE O/P EST LOW 20 MIN: CPT

## 2024-05-21 ENCOUNTER — RX RENEWAL (OUTPATIENT)
Age: 49
End: 2024-05-21

## 2024-05-23 RX ORDER — FLUTICASONE PROPIONATE 50 UG/1
50 SPRAY, METERED NASAL
Qty: 48 | Refills: 7 | Status: ACTIVE | COMMUNITY
Start: 2022-07-08 | End: 1900-01-01

## 2024-05-28 PROBLEM — M47.812 CERVICAL SPONDYLOSIS: Status: ACTIVE | Noted: 2022-04-08

## 2024-05-28 PROBLEM — M70.61 TROCHANTERIC BURSITIS OF BOTH HIPS: Status: ACTIVE | Noted: 2022-01-26

## 2024-05-28 NOTE — HISTORY OF PRESENT ILLNESS
[FreeTextEntry1] : 48-year-old female Is followed in the office for diffuse joint pain. Presents with an exacerbation of bilateral hip pain. She has bilateral hip pain for 3 months  She denies muscle overuse  Bilateral hip pain Pain:  8/10 Worse: 10/10 Quality: sharp  Frequency: constant Starts and lateral aspect of both hips.  The pain radiates toward the back.  The pain is worse with sitting more than 20 minutes of standing more than 20 minutes or even laying at night on either side of her body. He denies leg weakness. She denies bowel bladder difficulties.  Neck pain Pain:  9/10 Worse: 10/10 Quality: ache Frequency: constant She has pain that starts at the base of the neck and radiates out to the top of the shoulders.  The pain radiates to the mid humerus.  Does not radiate to her hands.  The pain is worse with use of her arms away from the body.

## 2024-05-28 NOTE — REVIEW OF SYSTEMS
[Fever] : no fever [Eye Pain] : no eye pain [Earache] : no earache [Chest Pain] : no chest pain [Shortness Of Breath] : no shortness of breath [Abdominal Pain] : no abdominal pain [Dysuria] : no dysuria [Joint Pain] : joint pain [Muscle Pain] : muscle pain [Joint Stiffness] : joint stiffness [Muscle Weakness] : muscle weakness [Skin Wound] : no skin wound [Dizziness] : no dizziness [Insomnia] : no insomnia [Easy Bruising] : no tendency for easy bruising

## 2024-05-28 NOTE — PHYSICAL EXAM
[FreeTextEntry1] : Physical Exam  General:     well developed, well nourished, in no acute distress.   Head:     normocephalic and atraumatic.   Eyes:     PERRL/EOM intact, conjunctiva and sclera clear with out nystagmus.   Ears:     TM's intact and clear with normal canals with grossly normal hearing.   Nose:     no deformity, discharge, inflammation, or lesions.   Mouth:     no deformity or lesions with good dentition.   Neck:    Spasm in the paracervical muscles R>L. Range of motion and rotation 0 to 60 degrees.  Negative Spurling's. Chest Wall:     no deformities  Lungs:     clear bilaterally to auscultation.   Heart:     non-displaced PMI, chest non-tender; regular rate and rhythm, S1, S2 without murmurs, rubs, or gallops Abdomen:      normal bowel sounds; no hepatosplenomegaly no ventral,umbilical hernias or masses noted.   Msk:      thoracic spine: Tenderness in the paraspinal muscles between the shoulder blades.  Pulses:     pulses normal in all 4 extremities.   Extremities:     LUE: Shoulder flexion 0-100 abduction 0-100 MS 4-/5 mild tender on palpation of the traps Elbow FAROM, MS 4/5 reflexes 2/4 mild tender with pain on resisted wrist extension Wrist FAROM, MS 5/5 reflexes 2/4 sensation is intact to light touch, pinprick and proprioception  RUE: Shoulder flexion 120 abduction 0-120 MS 4-/5 + tender on palpation of the top of the shoulder and anteriorly Elbow FAROM, MS 4/5 reflexes 2/4 Wrist FAROM, MS 5/5 reflexes 2/4 sensation is intact to light touch, pinprick and proprioception  LLE: Hip FAROM, MS 4/5 Very tender on palpation of the hip bursa knee: FAROM, MS 5/5 Reflexes 2/4 ankle: FAROM, MS 5/5 reflexes 2/4 sensation is intact to light touch, pinprick and proprioception  RLE: Hip FAROM, MS 4/5 Very tender palpation of the hip bursa knee: FAROM, MS 5/5 Reflexes 2/4 ankle: FAROM, MS 5/5 reflexes 2/4 sensation is intact to light touch, pinprick and proprioception  Neurologic:     Gait: Spontaneous, reciprocal and safe without an assistive device

## 2024-06-14 ENCOUNTER — APPOINTMENT (OUTPATIENT)
Dept: RHEUMATOLOGY | Facility: CLINIC | Age: 49
End: 2024-06-14

## 2024-07-03 ENCOUNTER — APPOINTMENT (OUTPATIENT)
Dept: PHYSICAL MEDICINE AND REHAB | Facility: CLINIC | Age: 49
End: 2024-07-03
Payer: COMMERCIAL

## 2024-07-03 VITALS
BODY MASS INDEX: 26.84 KG/M2 | RESPIRATION RATE: 17 BRPM | SYSTOLIC BLOOD PRESSURE: 122 MMHG | WEIGHT: 167 LBS | HEART RATE: 71 BPM | DIASTOLIC BLOOD PRESSURE: 78 MMHG | OXYGEN SATURATION: 99 % | HEIGHT: 66 IN | TEMPERATURE: 97.8 F

## 2024-07-03 DIAGNOSIS — M70.62 TROCHANTERIC BURSITIS, RIGHT HIP: ICD-10-CM

## 2024-07-03 DIAGNOSIS — M70.61 TROCHANTERIC BURSITIS, RIGHT HIP: ICD-10-CM

## 2024-07-03 PROCEDURE — 99214 OFFICE O/P EST MOD 30 MIN: CPT | Mod: 25

## 2024-07-03 PROCEDURE — 20610 DRAIN/INJ JOINT/BURSA W/O US: CPT

## 2024-07-03 RX ORDER — MELOXICAM 15 MG/1
15 TABLET ORAL
Qty: 30 | Refills: 0 | Status: ACTIVE | COMMUNITY
Start: 2024-07-03 | End: 1900-01-01

## 2024-07-15 RX ORDER — TRIAMCINOLONE ACETONIDE 40 MG/ML
40 SUSPENSION INTRA-ARTERIAL; INTRAMUSCULAR
Qty: 1 | Refills: 0 | Status: COMPLETED | OUTPATIENT
Start: 2024-07-15

## 2024-07-15 RX ORDER — LIDOCAINE HYDROCHLORIDE 10 MG/ML
1 INJECTION, SOLUTION INFILTRATION; PERINEURAL
Qty: 0 | Refills: 0 | Status: COMPLETED | OUTPATIENT
Start: 2024-07-15

## 2024-07-15 RX ADMIN — LIDOCAINE HYDROCHLORIDE 1 %: 10 INJECTION, SOLUTION EPIDURAL; INFILTRATION; INTRACAUDAL; PERINEURAL at 00:00

## 2024-07-15 RX ADMIN — TRIAMCINOLONE ACETONIDE 40 MG/ML: 40 INJECTION, SUSPENSION INTRA-ARTICULAR; INTRAMUSCULAR at 00:00

## 2024-08-01 ENCOUNTER — APPOINTMENT (OUTPATIENT)
Dept: PHYSICAL MEDICINE AND REHAB | Facility: CLINIC | Age: 49
End: 2024-08-01

## 2024-08-01 VITALS
WEIGHT: 171 LBS | DIASTOLIC BLOOD PRESSURE: 80 MMHG | OXYGEN SATURATION: 98 % | SYSTOLIC BLOOD PRESSURE: 140 MMHG | HEART RATE: 93 BPM | HEIGHT: 66 IN | TEMPERATURE: 97.8 F | BODY MASS INDEX: 27.48 KG/M2 | RESPIRATION RATE: 17 BRPM

## 2024-08-01 PROCEDURE — 97810 ACUP 1/> WO ESTIM 1ST 15 MIN: CPT

## 2024-08-01 PROCEDURE — 99213 OFFICE O/P EST LOW 20 MIN: CPT | Mod: 25

## 2024-08-01 PROCEDURE — 97811 ACUP 1/> W/O ESTIM EA ADD 15: CPT

## 2024-08-01 NOTE — HISTORY OF PRESENT ILLNESS
[FreeTextEntry1] : 48-year-old female Is followed in the office for diffuse joint pain. Presents with an exacerbation of bilateral hip pain. She has bilateral hip pain for 3 months  She denies muscle overuse She had 1 session of therapy last week.   Bilateral hip pain right hip  Pain:  8/10 Worse: 10/10 Quality: sharp  Frequency: constant left hip  Pain:  7/10 Worse: 10/10 Quality: sharp  Frequency: constant Starts and lateral aspect of both hips.  The pain radiates toward the back.  The pain is worse with sitting more than 20 minutes of standing more than 20 minutes or even laying at night on either side of her body. He denies leg weakness. She denies bowel bladder difficulties.  Neck pain Pain:  7/10 Worse: 10/10 Quality: ache Frequency: constant She has pain that starts at the base of the neck and radiates out to the top of the shoulders.  The pain radiates to the mid humerus.  Does not radiate to her hands.  The pain is worse with use of her arms away from the body.

## 2024-08-01 NOTE — PHYSICAL EXAM
HISTORY OF PRESENT ILLNESS     HPI  Patient presents for a follow-up visit.  His A1c was 8.2 today.  He states he did not start the lisinopril secondary to concerns about side effects.  He has been working hard denies any chest pain shortness of breath hypoglycemia or sores on his feet.  PAST MEDICAL, FAMILY AND SOCIAL HISTORY     The following histories were personally reviewed and updated.  Current medications, Allergies, Problem list, Past Medical History, Past Surgical History, Social History and Family History    REVIEW OF SYSTEMS     Review of Systems   Constitutional: Negative for activity change, appetite change, chills, diaphoresis, fatigue, fever and unexpected weight change.   HENT: Negative for congestion, ear discharge, ear pain, hearing loss, mouth sores, postnasal drip, sinus pressure, sinus pain, sore throat, trouble swallowing and voice change.    Eyes: Negative for photophobia, pain, discharge, redness, itching and visual disturbance.   Respiratory: Negative for cough, chest tightness, shortness of breath and wheezing.    Cardiovascular: Negative for chest pain, palpitations and leg swelling.   Gastrointestinal: Negative for abdominal pain, blood in stool, constipation, diarrhea, nausea and vomiting.   Endocrine: Negative for cold intolerance, heat intolerance, polydipsia, polyphagia and polyuria.   Genitourinary: Negative for decreased urine volume, difficulty urinating, dysuria, flank pain, frequency, hematuria and urgency.   Musculoskeletal: Negative for arthralgias, back pain, gait problem, joint swelling, myalgias, neck pain and neck stiffness.   Skin: Negative for rash and wound.   Allergic/Immunologic: Negative for environmental allergies.   Neurological: Negative for dizziness, tremors, syncope, weakness, light-headedness, numbness and headaches.   Hematological: Negative for adenopathy. Does not bruise/bleed easily.   Psychiatric/Behavioral: Negative for decreased concentration,  You may mail her the note from the 1/14 telephone encounter from me.  Thank you.    Jonathan Perez MD  [facility]  2/20/2019, 9:21 PM     dysphoric mood, hallucinations, self-injury, sleep disturbance and suicidal ideas. The patient is not nervous/anxious.    All other systems reviewed and are negative.      PHYSICAL EXAM     Physical Exam  Vitals and nursing note reviewed.   Constitutional:       General: He is not in acute distress.     Appearance: He is well-developed. He is obese. He is not diaphoretic.   HENT:      Head: Normocephalic and atraumatic.      Right Ear: External ear normal.      Left Ear: External ear normal.      Nose: Nose normal.      Mouth/Throat:      Pharynx: No oropharyngeal exudate.      Neck: Normal range of motion and neck supple.   Eyes:      General: No scleral icterus.        Right eye: No discharge.         Left eye: No discharge.      Conjunctiva/sclera: Conjunctivae normal.      Pupils: Pupils are equal, round, and reactive to light.   Neck:      Thyroid: No thyromegaly.      Vascular: No JVD.      Trachea: No tracheal deviation.   Cardiovascular:      Rate and Rhythm: Normal rate and regular rhythm.      Heart sounds: No murmur heard.    No friction rub. No gallop.   Pulmonary:      Effort: Pulmonary effort is normal. No respiratory distress.      Breath sounds: Normal breath sounds. No stridor. No wheezing or rales.   Chest:      Chest wall: No tenderness.   Abdominal:      General: Bowel sounds are normal. There is no distension.      Palpations: Abdomen is soft. There is no mass.      Tenderness: There is no abdominal tenderness. There is no guarding or rebound.   Musculoskeletal:         General: No tenderness or deformity. Normal range of motion.   Lymphadenopathy:      Cervical: No cervical adenopathy.   Skin:     General: Skin is warm and dry.      Findings: No erythema or rash.   Neurological:      Mental Status: He is alert and oriented to person, place, and time.      Cranial Nerves: No cranial nerve deficit.      Deep Tendon Reflexes: Reflexes are normal and symmetric. Reflexes normal.   Psychiatric:          Behavior: Behavior normal.         Thought Content: Thought content normal.         Judgment: Judgment normal.         ASSESSMENT/PLAN   #1.  Type 2 diabetes mellitus with associated essential hypertension and mixed dyslipidemia-patient was counseled at length.  We will up his Trulicity from 0.75-1.5 we will continue his current dosing of metformin and Vytorin.  Patient will also begin lisinopril 2.5 mg risk-benefit side effects and rationale for treatment discussed.  Patient will have fasting labs done.  2.  Generalized anxiety disorder well-controlled continue current dosing of venlafaxine Exar 75 mg.  He will follow-up in 3 months   [FreeTextEntry1] : Physical Exam  General:     well developed, well nourished, in no acute distress.   Head:     normocephalic and atraumatic.   Eyes:     PERRL/EOM intact, conjunctiva and sclera clear with out nystagmus.   Ears:     TM's intact and clear with normal canals with grossly normal hearing.   Nose:     no deformity, discharge, inflammation, or lesions.   Mouth:     no deformity or lesions with good dentition.   Neck:    Spasm in the paracervical muscles R>L. Range of motion and rotation 0 to 60 degrees.  Negative Spurling's. Chest Wall:     no deformities  Lungs:     clear bilaterally to auscultation.   Heart:     non-displaced PMI, chest non-tender; regular rate and rhythm, S1, S2 without murmurs, rubs, or gallops Abdomen:      normal bowel sounds; no hepatosplenomegaly no ventral,umbilical hernias or masses noted.   Msk:      thoracic spine: Tenderness in the paraspinal muscles between the shoulder blades.  Pulses:     pulses normal in all 4 extremities.   Extremities:     LUE: Shoulder flexion 0-100 abduction 0-100 MS 4-/5 mild tender on palpation of the traps Elbow FAROM, MS 4/5 reflexes 2/4 mild tender with pain on resisted wrist extension Wrist FAROM, MS 5/5 reflexes 2/4 sensation is intact to light touch, pinprick and proprioception  RUE: Shoulder flexion 120 abduction 0-120 MS 4-/5 + tender on palpation of the top of the shoulder and anteriorly Elbow FAROM, MS 4/5 reflexes 2/4 Wrist FAROM, MS 5/5 reflexes 2/4 sensation is intact to light touch, pinprick and proprioception  LLE: Hip FAROM, MS 4/5 Very tender on palpation of the hip bursa knee: FAROM, MS 5/5 Reflexes 2/4 ankle: FAROM, MS 5/5 reflexes 2/4 sensation is intact to light touch, pinprick and proprioception  RLE: Hip FAROM, MS 4/5 Very tender palpation of the hip bursa knee: FAROM, MS 5/5 Reflexes 2/4 ankle: FAROM, MS 5/5 reflexes 2/4 sensation is intact to light touch, pinprick and proprioception  Neurologic:     Gait: Spontaneous, reciprocal and safe without an assistive device

## 2024-08-07 ENCOUNTER — APPOINTMENT (OUTPATIENT)
Dept: INTERNAL MEDICINE | Facility: CLINIC | Age: 49
End: 2024-08-07

## 2024-08-08 ENCOUNTER — APPOINTMENT (OUTPATIENT)
Dept: PHYSICAL MEDICINE AND REHAB | Facility: CLINIC | Age: 49
End: 2024-08-08

## 2024-08-20 ENCOUNTER — RX RENEWAL (OUTPATIENT)
Age: 49
End: 2024-08-20

## 2024-10-07 ENCOUNTER — RX RENEWAL (OUTPATIENT)
Age: 49
End: 2024-10-07

## 2024-11-05 ENCOUNTER — APPOINTMENT (OUTPATIENT)
Dept: PHYSICAL MEDICINE AND REHAB | Facility: CLINIC | Age: 49
End: 2024-11-05

## 2024-11-05 VITALS
OXYGEN SATURATION: 97 % | SYSTOLIC BLOOD PRESSURE: 136 MMHG | HEART RATE: 88 BPM | BODY MASS INDEX: 27.64 KG/M2 | HEIGHT: 66 IN | WEIGHT: 172 LBS | RESPIRATION RATE: 17 BRPM | DIASTOLIC BLOOD PRESSURE: 74 MMHG

## 2024-11-05 DIAGNOSIS — M70.61 TROCHANTERIC BURSITIS, RIGHT HIP: ICD-10-CM

## 2024-11-05 DIAGNOSIS — M54.2 CERVICALGIA: ICD-10-CM

## 2024-11-05 DIAGNOSIS — M25.511 PAIN IN RIGHT SHOULDER: ICD-10-CM

## 2024-11-05 DIAGNOSIS — M70.62 TROCHANTERIC BURSITIS, RIGHT HIP: ICD-10-CM

## 2024-11-05 PROCEDURE — 20610 DRAIN/INJ JOINT/BURSA W/O US: CPT | Mod: 50

## 2024-11-05 PROCEDURE — 99213 OFFICE O/P EST LOW 20 MIN: CPT | Mod: 25

## 2024-11-05 RX ORDER — LIDOCAINE HYDROCHLORIDE 10 MG/ML
1 INJECTION, SOLUTION INFILTRATION; PERINEURAL
Refills: 0 | Status: COMPLETED | OUTPATIENT
Start: 2024-11-05

## 2024-11-05 RX ORDER — TRIAMCINOLONE ACETONIDE 40 MG/ML
40 SUSPENSION INTRA-ARTERIAL; INTRAMUSCULAR
Qty: 1 | Refills: 0 | Status: COMPLETED | OUTPATIENT
Start: 2024-11-05

## 2024-11-05 RX ADMIN — LIDOCAINE HYDROCHLORIDE 1 %: 10 INJECTION, SOLUTION EPIDURAL; INFILTRATION; INTRACAUDAL; PERINEURAL at 00:00

## 2024-11-05 RX ADMIN — TRIAMCINOLONE ACETONIDE 40 MG/ML: 40 INJECTION, SUSPENSION INTRA-ARTICULAR; INTRAMUSCULAR at 00:00

## 2024-11-18 ENCOUNTER — APPOINTMENT (OUTPATIENT)
Dept: PHYSICAL MEDICINE AND REHAB | Facility: CLINIC | Age: 49
End: 2024-11-18
Payer: COMMERCIAL

## 2024-11-18 DIAGNOSIS — M25.511 PAIN IN RIGHT SHOULDER: ICD-10-CM

## 2024-11-18 DIAGNOSIS — M70.62 TROCHANTERIC BURSITIS, RIGHT HIP: ICD-10-CM

## 2024-11-18 DIAGNOSIS — M70.61 TROCHANTERIC BURSITIS, RIGHT HIP: ICD-10-CM

## 2024-11-18 DIAGNOSIS — M54.2 CERVICALGIA: ICD-10-CM

## 2024-11-18 PROCEDURE — 97811 ACUP 1/> W/O ESTIM EA ADD 15: CPT

## 2024-11-18 PROCEDURE — 20610 DRAIN/INJ JOINT/BURSA W/O US: CPT | Mod: 50

## 2024-11-18 PROCEDURE — 99213 OFFICE O/P EST LOW 20 MIN: CPT | Mod: 25

## 2024-11-18 PROCEDURE — 97810 ACUP 1/> WO ESTIM 1ST 15 MIN: CPT

## 2024-11-18 RX ORDER — TRIAMCINOLONE ACETONIDE 40 MG/ML
40 SUSPENSION INTRA-ARTERIAL; INTRAMUSCULAR
Qty: 1 | Refills: 0 | Status: COMPLETED | OUTPATIENT
Start: 2024-11-18

## 2024-11-18 RX ORDER — LIDOCAINE HYDROCHLORIDE 10 MG/ML
1 INJECTION, SOLUTION INFILTRATION; PERINEURAL
Refills: 0 | Status: COMPLETED | OUTPATIENT
Start: 2024-11-18

## 2024-11-18 RX ADMIN — LIDOCAINE HYDROCHLORIDE 1 %: 10 INJECTION, SOLUTION EPIDURAL; INFILTRATION; INTRACAUDAL; PERINEURAL at 00:00

## 2024-11-18 RX ADMIN — TRIAMCINOLONE ACETONIDE 40 MG/ML: 40 INJECTION, SUSPENSION INTRA-ARTICULAR; INTRAMUSCULAR at 00:00

## 2024-11-18 NOTE — HISTORY OF PRESENT ILLNESS
[FreeTextEntry1] : 48-year-old female Is followed in the office for diffuse joint pain. Presents with an exacerbation of bilateral hip pain. She has bilateral hip pain for 3 months  She denies muscle overuse She has not been to back to restorative therapy since her last office visit.  Bilateral hip pain right hip  Pain:  3/10 Worse: 10/10 Quality: sharp  Frequency: constant left hip  Pain:  7/10 Worse: 10/10 Quality: sharp  Frequency: constant The steroid injection to her right hip on November 5, 2024 worked well to decrease her pain. She is requesting injection to her left hip. Starts and lateral aspect of both hips.  The pain radiates toward the back.  The pain is worse with sitting more than 20 minutes of standing more than 20 minutes or even laying at night on either side of her body. He denies leg weakness. She denies bowel bladder difficulties.  Neck pain Pain:  6/10 Worse: 10/10 Quality: ache Frequency: constant Acupuncture on November 5, 2024 worked well to decrease her neck pain.  She wishes to continue this modality. She has pain that starts at the base of the neck and radiates out to the top of the shoulders.  The pain radiates to the mid humerus.  Does not radiate to her hands.  The pain is worse with use of her arms away from the body.

## 2024-11-18 NOTE — DATA REVIEWED
[Plain X-Rays] : plain X-Rays [MRI] : MRI [FreeTextEntry1] : \par  EMG/NCV of the upper extremities formed on June 20, 2019\par  1.	Normal EMG/NCV. Her's cervical pain is not a confounding source of her right shoulder pain.\par  \par  RIGHT KNEE XRAY AP LATERAL AND OBLIQUE 3 VIEWS\par  IMPRESSION: There are mild osteoarthritic degenerative changes. M17.11\par  There are no fractures.\par  \par  Xray of the shoulder neg.\par  Xray of the lumbar: mild arthritis.\par  MRI of the cervical spine reveals thickening of the longitudinal ligament T1/T2  effacing the epidural space. Reversal of cervical lordosis\par  Mri of the  right shoulder reveals low grade partial thickness tear of the inferior fibers of the infraspinatus tendon. Suspected SLAP tear of the labrum\par  \par  MRI of the brain without contrast performed on March 28, 2023 is negative\par  \par  X-ray of her cervical spine performed on March 8, 2023\par  Minimal degenerative changes at C5/6 and  C6/7\par   Number Of Freeze-Thaw Cycles: 1 freeze-thaw cycle Render Note In Bullet Format When Appropriate: No Medical Necessity Clause: This procedure was medically necessary because the lesions that were treated were: Consent: The patient's consent was obtained including but not limited to risks of crusting, scabbing, blistering, scarring, darker or lighter pigmentary change, recurrence, incomplete removal and infection. Medical Necessity Information: It is in your best interest to select a reason for this procedure from the list below. All of these items fulfill various CMS LCD requirements except the new and changing color options. Detail Level: Detailed Post-Care Instructions: I reviewed with the patient in detail post-care instructions. Patient is to wear sunprotection, and avoid picking at any of the treated lesions. Pt may apply Vaseline to crusted or scabbing areas.

## 2025-02-26 ENCOUNTER — RX RENEWAL (OUTPATIENT)
Age: 50
End: 2025-02-26

## 2025-03-11 ENCOUNTER — APPOINTMENT (OUTPATIENT)
Dept: PHYSICAL MEDICINE AND REHAB | Facility: CLINIC | Age: 50
End: 2025-03-11
Payer: COMMERCIAL

## 2025-03-11 VITALS
RESPIRATION RATE: 18 BRPM | HEIGHT: 66 IN | DIASTOLIC BLOOD PRESSURE: 80 MMHG | WEIGHT: 180 LBS | SYSTOLIC BLOOD PRESSURE: 128 MMHG | BODY MASS INDEX: 28.93 KG/M2 | OXYGEN SATURATION: 98 % | HEART RATE: 86 BPM

## 2025-03-11 DIAGNOSIS — M47.812 SPONDYLOSIS W/OUT MYELOPATHY OR RADICULOPATHY, CERVICAL REGION: ICD-10-CM

## 2025-03-11 PROCEDURE — 99214 OFFICE O/P EST MOD 30 MIN: CPT | Mod: 25

## 2025-03-11 PROCEDURE — 20610 DRAIN/INJ JOINT/BURSA W/O US: CPT | Mod: LT

## 2025-03-13 ENCOUNTER — NON-APPOINTMENT (OUTPATIENT)
Age: 50
End: 2025-03-13

## 2025-03-13 ENCOUNTER — APPOINTMENT (OUTPATIENT)
Dept: INTERNAL MEDICINE | Facility: CLINIC | Age: 50
End: 2025-03-13
Payer: COMMERCIAL

## 2025-03-13 VITALS
BODY MASS INDEX: 30.22 KG/M2 | HEART RATE: 90 BPM | RESPIRATION RATE: 18 BRPM | WEIGHT: 188 LBS | DIASTOLIC BLOOD PRESSURE: 90 MMHG | SYSTOLIC BLOOD PRESSURE: 156 MMHG | HEIGHT: 66 IN | TEMPERATURE: 98.7 F | OXYGEN SATURATION: 98 %

## 2025-03-13 DIAGNOSIS — E66.9 OBESITY, UNSPECIFIED: ICD-10-CM

## 2025-03-13 DIAGNOSIS — Z00.00 ENCOUNTER FOR GENERAL ADULT MEDICAL EXAMINATION W/OUT ABNORMAL FINDINGS: ICD-10-CM

## 2025-03-13 DIAGNOSIS — Z13.6 ENCOUNTER FOR SCREENING FOR CARDIOVASCULAR DISORDERS: ICD-10-CM

## 2025-03-13 DIAGNOSIS — Z71.89 OTHER SPECIFIED COUNSELING: ICD-10-CM

## 2025-03-13 DIAGNOSIS — I10 ESSENTIAL (PRIMARY) HYPERTENSION: ICD-10-CM

## 2025-03-13 DIAGNOSIS — R73.03 PREDIABETES.: ICD-10-CM

## 2025-03-13 DIAGNOSIS — Z12.39 ENCOUNTER FOR OTHER SCREENING FOR MALIGNANT NEOPLASM OF BREAST: ICD-10-CM

## 2025-03-13 DIAGNOSIS — Z12.11 ENCOUNTER FOR SCREENING FOR MALIGNANT NEOPLASM OF COLON: ICD-10-CM

## 2025-03-13 DIAGNOSIS — R79.89 OTHER SPECIFIED ABNORMAL FINDINGS OF BLOOD CHEMISTRY: ICD-10-CM

## 2025-03-13 PROCEDURE — 36415 COLL VENOUS BLD VENIPUNCTURE: CPT

## 2025-03-13 PROCEDURE — G0444 DEPRESSION SCREEN ANNUAL: CPT | Mod: 59

## 2025-03-13 PROCEDURE — G0447 BEHAVIOR COUNSEL OBESITY 15M: CPT | Mod: 59

## 2025-03-13 PROCEDURE — 99386 PREV VISIT NEW AGE 40-64: CPT

## 2025-03-13 PROCEDURE — 93000 ELECTROCARDIOGRAM COMPLETE: CPT | Mod: 59

## 2025-03-13 PROCEDURE — 99497 ADVNCD CARE PLAN 30 MIN: CPT

## 2025-03-13 RX ORDER — TIRZEPATIDE 2.5 MG/.5ML
2.5 INJECTION, SOLUTION SUBCUTANEOUS
Qty: 1 | Refills: 1 | Status: ACTIVE | COMMUNITY
Start: 2025-03-13 | End: 1900-01-01

## 2025-03-13 RX ORDER — AMLODIPINE BESYLATE AND BENAZEPRIL HYDROCHLORIDE 5; 20 MG/1; MG/1
5-20 CAPSULE ORAL
Qty: 30 | Refills: 1 | Status: ACTIVE | COMMUNITY
Start: 2025-03-13 | End: 1900-01-01

## 2025-03-13 RX ORDER — BLOOD-GLUCOSE METER
KIT MISCELLANEOUS
Qty: 1 | Refills: 0 | Status: ACTIVE | COMMUNITY
Start: 2025-03-13 | End: 1900-01-01

## 2025-03-13 NOTE — HEALTH RISK ASSESSMENT
[Good] : ~his/her~  mood as  good [No] : No [No falls in past year] : Patient reported no falls in the past year [0] : 2) Feeling down, depressed, or hopeless: Not at all (0) [PHQ-2 Negative - No further assessment needed] : PHQ-2 Negative - No further assessment needed [Time Spent: ___ Minutes] : I spent [unfilled] minutes performing a depression screening for this patient. [Never] : Never [HIV test declined] : HIV test declined [Hepatitis C test declined] : Hepatitis C test declined [With Family] : lives with family [] :  [# Of Children ___] : has [unfilled] children [Sexually Active] : sexually active [Feels Safe at Home] : Feels safe at home [Fully functional (bathing, dressing, toileting, transferring, walking, feeding)] : Fully functional (bathing, dressing, toileting, transferring, walking, feeding) [Fully functional (using the telephone, shopping, preparing meals, housekeeping, doing laundry, using] : Fully functional and needs no help or supervision to perform IADLs (using the telephone, shopping, preparing meals, housekeeping, doing laundry, using transportation, managing medications and managing finances) [Reports normal functional visual acuity (ie: able to read med bottle)] : Reports normal functional visual acuity [Smoke Detector] : smoke detector [Safety elements used in home] : safety elements used in home [Seat Belt] :  uses seat belt [With Patient/Caregiver] : , with patient/caregiver [Designated Healthcare Proxy] : Designated healthcare proxy [Name: ___] : Health Care Proxy's Name: [unfilled]  [Relationship: ___] : Relationship: [unfilled] [Aggressive treatment] : aggressive treatment [I will adhere to the patient's wishes.] : I will adhere to the patient's wishes. [Time Spent: ___ minutes] : Time Spent: [unfilled] minutes [Change in mental status noted] : No change in mental status noted [Language] : denies difficulty with language [Behavior] : denies difficulty with behavior [Learning/Retaining New Information] : denies difficulty learning/retaining new information [Handling Complex Tasks] : denies difficulty handling complex tasks [Reasoning] : denies difficulty with reasoning [Spatial Ability and Orientation] : denies difficulty with spatial ability and orientation [Reports changes in hearing] : Reports no changes in hearing [Reports changes in vision] : Reports no changes in vision [Reports changes in dental health] : Reports no changes in dental health [TB Exposure] : is not being exposed to tuberculosis [ColonoscopyComments] : referral for colonosopy [AdvancecareDate] : 03/13/25 [FreeTextEntry4] : [phone]  Met with SO KING, who was willing to discuss advance care planning.  Our advance care planning conversation included a discussion about:  1. The value and importance of advance care planning.  2. Experiences with loved ones who have been seriously ill or have .  3. Exploration of personal, cultural, or spiritual beliefs that might influence medical decisions.  4. Exploration of goals of care in the event of a sudden injury or illness.  5. Identification of a health care agent.  6. Review and update, or completion of, an advance directive.  Start time: ____________                    End time: ____________

## 2025-03-13 NOTE — ASSESSMENT
[FreeTextEntry1] : -Medical Annual wellness visit completed: -General Lab ordered waiting for results discussed with patient -HRA completed and reviewed with patient -Medical, family, surgical history reviewed with patient and updated -List of current providers r/w patient and updated -Vitals, BMI reviewed and discussed along with healthy BMI goals. Dietary counseling x 15 minutes provided -Depression PHQ 9 completed and reviewed -Annual safety assessment reviewed -discussed advanced directives  -Established routine screening and immunization schedule  Time spent 42 minutes including counseling, charting, face-to-face encounter, chart review   No

## 2025-03-13 NOTE — PHYSICAL EXAM
[No Acute Distress] : no acute distress [Well Nourished] : well nourished [Well Developed] : well developed [Well-Appearing] : well-appearing [Normal Sclera/Conjunctiva] : normal sclera/conjunctiva [PERRL] : pupils equal round and reactive to light [EOMI] : extraocular movements intact [Normal Outer Ear/Nose] : the outer ears and nose were normal in appearance [Normal Oropharynx] : the oropharynx was normal [No JVD] : no jugular venous distention [No Lymphadenopathy] : no lymphadenopathy [Supple] : supple [Thyroid Normal, No Nodules] : the thyroid was normal and there were no nodules present [No Respiratory Distress] : no respiratory distress  [No Accessory Muscle Use] : no accessory muscle use [Clear to Auscultation] : lungs were clear to auscultation bilaterally [Normal Rate] : normal rate  [Regular Rhythm] : with a regular rhythm [Normal S1, S2] : normal S1 and S2 [No Murmur] : no murmur heard [No Carotid Bruits] : no carotid bruits [No Abdominal Bruit] : a ~M bruit was not heard ~T in the abdomen [No Varicosities] : no varicosities [Pedal Pulses Present] : the pedal pulses are present [No Edema] : there was no peripheral edema [No Palpable Aorta] : no palpable aorta [No Extremity Clubbing/Cyanosis] : no extremity clubbing/cyanosis [Soft] : abdomen soft [Non Tender] : non-tender [Non-distended] : non-distended [No Masses] : no abdominal mass palpated [No HSM] : no HSM [Normal Bowel Sounds] : normal bowel sounds [Normal Posterior Cervical Nodes] : no posterior cervical lymphadenopathy [Normal Anterior Cervical Nodes] : no anterior cervical lymphadenopathy [No CVA Tenderness] : no CVA  tenderness [No Spinal Tenderness] : no spinal tenderness [No Rash] : no rash [Coordination Grossly Intact] : coordination grossly intact [No Focal Deficits] : no focal deficits [Normal Gait] : normal gait [Deep Tendon Reflexes (DTR)] : deep tendon reflexes were 2+ and symmetric [Normal Affect] : the affect was normal [Normal Insight/Judgement] : insight and judgment were intact [de-identified] : ain located at the lateral aspect of both hips radiating toward the back. Pain response is severe with an intensity of 6/10 on the right hip and 7/10 on the left, worsening  Pain quality is described as sharp and is constant.

## 2025-03-13 NOTE — HISTORY OF PRESENT ILLNESS
[FreeTextEntry1] : KASH [de-identified] : 48 y/o female with pmhx of preeclampsia, HTN, Overweight, Prediabetes, here for  f/u on labetalol 200 mg BID, hydrochlotiazide daily. on gabapentin/ naproxen 500mgs. hip Bursitis, h/o chronic HA. joint pain is experiencing a significant exacerbation of chronic bilateral hip pain, persisting for three months. She reports severe, sharp, and constant pain in both hips, notably worse with sitting or standing for prolonged periods or when lying on either side at night.   No medical complaints.

## 2025-03-13 NOTE — COUNSELING
[Fall prevention counseling provided] : Fall prevention counseling provided [Adequate lighting] : Adequate lighting [No throw rugs] : No throw rugs [Use proper foot wear] : Use proper foot wear [Use recommended devices] : Use recommended devices [Behavioral health counseling provided] : Behavioral health counseling provided [Sleep ___ hours/day] : Sleep [unfilled] hours/day [Engage in a relaxing activity] : Engage in a relaxing activity [Plan in advance] : Plan in advance [Potential consequences of obesity discussed] : Potential consequences of obesity discussed [Benefits of weight loss discussed] : Benefits of weight loss discussed [Encouraged to maintain food diary] : Encouraged to maintain food diary [Encouraged to increase physical activity] : Encouraged to increase physical activity [Encouraged to use exercise tracking device] : Encouraged to use exercise tracking device [Target Wt Loss Goal ___] : Weight Loss Goals: Target weight loss goal [unfilled] lbs [Weigh Self Weekly] : weigh self weekly [Decrease Portions] : decrease portions [____ min/wk Activity] : [unfilled] min/wk activity [Keep Food Diary] : keep food diary [None] : None [Good understanding] : Patient has a good understanding of lifestyle changes and steps needed to achieve self management goal [FreeTextEntry4] : 16 [de-identified] :   Dscd. D/E wt.loss needs to loose 10% TBW. DSCD.self monitoring, goal setting, problem solving w-b mod. Portion control, avoidance of skipping meals, high glycemic foods, snacking and mindless eating in front of the TV. Suggested use of small plates and not keeping all of the food on the dinner table and leaving it at the stove top. Pt was also told to calorie count and an emely was recommended DSCD exercising 20 minutes per day: dscd:med /pharmaco bariatric tx options.     - Encouraged a low fat/low cholesterol diet   - Discussed Healthy eating, avoidance of concentrated sweets, and to include vegetables by at least 3 meals a day   - encouraged low glycemic fruits, grains and vegetables and a diet high in plant protein   - Discussed regular exercise   - Discussed importance of follow up physician visits

## 2025-03-17 DIAGNOSIS — E55.9 VITAMIN D DEFICIENCY, UNSPECIFIED: ICD-10-CM

## 2025-03-17 LAB
25(OH)D3 SERPL-MCNC: 14.3 NG/ML
ALBUMIN SERPL ELPH-MCNC: 4.5 G/DL
ALP BLD-CCNC: 52 U/L
ALT SERPL-CCNC: 12 U/L
ANION GAP SERPL CALC-SCNC: 13 MMOL/L
APPEARANCE: CLEAR
AST SERPL-CCNC: 14 U/L
BASOPHILS # BLD AUTO: 0.05 K/UL
BASOPHILS NFR BLD AUTO: 0.6 %
BILIRUB SERPL-MCNC: 0.2 MG/DL
BILIRUBIN URINE: NEGATIVE
BLOOD URINE: NEGATIVE
BUN SERPL-MCNC: 14 MG/DL
CALCIUM SERPL-MCNC: 10.1 MG/DL
CHLORIDE SERPL-SCNC: 103 MMOL/L
CHOLEST SERPL-MCNC: 180 MG/DL
CO2 SERPL-SCNC: 24 MMOL/L
COLOR: YELLOW
CREAT SERPL-MCNC: 0.72 MG/DL
CREAT SPEC-SCNC: 314 MG/DL
EGFRCR SERPLBLD CKD-EPI 2021: 104 ML/MIN/1.73M2
EOSINOPHIL # BLD AUTO: 0.11 K/UL
EOSINOPHIL NFR BLD AUTO: 1.3 %
ESTIMATED AVERAGE GLUCOSE: 108 MG/DL
GGT SERPL-CCNC: 15 U/L
GLUCOSE QUALITATIVE U: NEGATIVE MG/DL
GLUCOSE SERPL-MCNC: 100 MG/DL
HBA1C MFR BLD HPLC: 5.4 %
HBV SURFACE AB SER QL: REACTIVE
HCT VFR BLD CALC: 37.2 %
HCV AB SER QL: NONREACTIVE
HCV S/CO RATIO: 0.16 S/CO
HDLC SERPL-MCNC: 67 MG/DL
HGB BLD-MCNC: 12.1 G/DL
IMM GRANULOCYTES NFR BLD AUTO: 0.2 %
KETONES URINE: NEGATIVE MG/DL
LDLC SERPL CALC-MCNC: 94 MG/DL
LEUKOCYTE ESTERASE URINE: NEGATIVE
LYMPHOCYTES # BLD AUTO: 2.69 K/UL
LYMPHOCYTES NFR BLD AUTO: 31.3 %
MAN DIFF?: NORMAL
MCHC RBC-ENTMCNC: 30.7 PG
MCHC RBC-ENTMCNC: 32.5 G/DL
MCV RBC AUTO: 94.4 FL
MICROALBUMIN 24H UR DL<=1MG/L-MCNC: 6.3 MG/DL
MICROALBUMIN/CREAT 24H UR-RTO: 20 MG/G
MONOCYTES # BLD AUTO: 0.65 K/UL
MONOCYTES NFR BLD AUTO: 7.6 %
NEUTROPHILS # BLD AUTO: 5.07 K/UL
NEUTROPHILS NFR BLD AUTO: 59 %
NITRITE URINE: NEGATIVE
NONHDLC SERPL-MCNC: 113 MG/DL
PH URINE: 5.5
PLATELET # BLD AUTO: 322 K/UL
POTASSIUM SERPL-SCNC: 4.3 MMOL/L
PROT SERPL-MCNC: 7.7 G/DL
PROTEIN URINE: NORMAL MG/DL
RBC # BLD: 3.94 M/UL
RBC # FLD: 12.7 %
SODIUM SERPL-SCNC: 140 MMOL/L
SPECIFIC GRAVITY URINE: 1.03
TRIGL SERPL-MCNC: 107 MG/DL
TSH SERPL-ACNC: 1.01 UIU/ML
UROBILINOGEN URINE: 0.2 MG/DL
VIT B12 SERPL-MCNC: 593 PG/ML
WBC # FLD AUTO: 8.59 K/UL

## 2025-03-17 RX ORDER — TRIAMCINOLONE ACETONIDE 40 MG/ML
40 SUSPENSION INTRA-ARTERIAL; INTRAMUSCULAR
Qty: 1 | Refills: 0 | Status: COMPLETED | OUTPATIENT
Start: 2025-03-17

## 2025-03-17 RX ORDER — ERGOCALCIFEROL 1.25 MG/1
1.25 MG CAPSULE, LIQUID FILLED ORAL
Qty: 12 | Refills: 0 | Status: ACTIVE | COMMUNITY
Start: 2025-03-17 | End: 1900-01-01

## 2025-03-17 RX ORDER — LIDOCAINE HYDROCHLORIDE 10 MG/ML
1 INJECTION, SOLUTION INFILTRATION; PERINEURAL
Refills: 0 | Status: COMPLETED | OUTPATIENT
Start: 2025-03-17

## 2025-03-17 RX ADMIN — TRIAMCINOLONE ACETONIDE 40 MG/ML: 40 INJECTION, SUSPENSION INTRA-ARTICULAR; INTRAMUSCULAR at 00:00

## 2025-03-17 RX ADMIN — LIDOCAINE HYDROCHLORIDE 1 %: 10 INJECTION, SOLUTION EPIDURAL; INFILTRATION; INTRACAUDAL; PERINEURAL at 00:00

## 2025-03-17 NOTE — HISTORY OF PRESENT ILLNESS
[FreeTextEntry1] : The patient was last seen November 18, 2024  49-year-old female Is followed in the office for diffuse joint pain. Presents with an exacerbation of bilateral hip pain. She has bilateral hip pain for 3 months.  She tried to self treat with over-the-counter nonsteroidal anti-inflammatories and rest.  Her pain is waking her up at night.  She presents for an injection to her hips  Bilateral hip pain right hip  Pain:  9/10 Worse: 10/10 Quality: sharp  Frequency: constant left hip  Pain:  9/10 Worse: 10/10 Quality: sharp  Frequency: constant The pain starts over the lateral aspect of both hips.  The pain radiates toward the back.  The pain is worse with sitting more than 20 minutes of standing more than 20 minutes she cannot lay on either side of her body at night. She denies leg weakness She denies bowel bladder difficulties.  Neck pain Pain:  6/10 Worse: 10/10 Quality: ache Frequency: constant Acupuncture on November 5, 2024 worked well to decrease her neck pain.  She wishes to continue this modality. She has pain that starts at the base of the neck and radiates out to the top of the shoulders.  The pain radiates to the mid humerus.  Does not radiate to her hands.  The pain is worse with use of her arms away from the body.

## 2025-03-19 ENCOUNTER — APPOINTMENT (OUTPATIENT)
Dept: PHYSICAL MEDICINE AND REHAB | Facility: CLINIC | Age: 50
End: 2025-03-19
Payer: COMMERCIAL

## 2025-03-19 VITALS
TEMPERATURE: 96.6 F | SYSTOLIC BLOOD PRESSURE: 122 MMHG | HEIGHT: 66 IN | DIASTOLIC BLOOD PRESSURE: 82 MMHG | OXYGEN SATURATION: 97 % | RESPIRATION RATE: 17 BRPM | BODY MASS INDEX: 30.22 KG/M2 | HEART RATE: 84 BPM | WEIGHT: 188 LBS

## 2025-03-19 DIAGNOSIS — M70.61 TROCHANTERIC BURSITIS, RIGHT HIP: ICD-10-CM

## 2025-03-19 DIAGNOSIS — M70.62 TROCHANTERIC BURSITIS, RIGHT HIP: ICD-10-CM

## 2025-03-19 DIAGNOSIS — M25.50 PAIN IN UNSPECIFIED JOINT: ICD-10-CM

## 2025-03-19 PROCEDURE — 20610 DRAIN/INJ JOINT/BURSA W/O US: CPT | Mod: RT

## 2025-03-19 PROCEDURE — 99213 OFFICE O/P EST LOW 20 MIN: CPT | Mod: 25

## 2025-03-19 RX ORDER — TRIAMCINOLONE ACETONIDE 40 MG/ML
40 SUSPENSION INTRA-ARTERIAL; INTRAMUSCULAR
Qty: 1 | Refills: 0 | Status: COMPLETED | OUTPATIENT
Start: 2025-03-19

## 2025-03-19 RX ORDER — LIDOCAINE HYDROCHLORIDE 10 MG/ML
1 INJECTION, SOLUTION INFILTRATION; PERINEURAL
Refills: 0 | Status: COMPLETED | OUTPATIENT
Start: 2025-03-19

## 2025-03-19 RX ADMIN — LIDOCAINE HYDROCHLORIDE 1 %: 10 INJECTION, SOLUTION INFILTRATION; PERINEURAL at 00:00

## 2025-03-19 RX ADMIN — TRIAMCINOLONE ACETONIDE 40 MG/ML: 40 INJECTION, SUSPENSION INTRA-ARTICULAR; INTRAMUSCULAR at 00:00

## 2025-03-19 NOTE — HISTORY OF PRESENT ILLNESS
[FreeTextEntry1] : The patient was last seen November 18, 2024  49-year-old female Is followed in the office for diffuse joint pain. Presents with an exacerbation of bilateral hip pain. She has bilateral hip pain for 3 months.  She tried to self treat with over-the-counter nonsteroidal anti-inflammatories and rest.  Her pain is waking her up at night.  She presents for an injection to her hips.  The patient states the injection to her left hip significantly improved her pain.  She anticipates starting restorative therapy later this week. She would like to receive an injection to her right hip today  Bilateral hip pain right hip  Pain:  9/10 Worse: 10/10 Quality: sharp  Frequency: constant left hip  Pain:  3/10 Worse: 10/10 Quality: sharp  Frequency: constant The pain starts over the lateral aspect of both hips.  The pain radiates toward the back.  The pain is worse with sitting more than 20 minutes of standing more than 20 minutes she cannot lay on either side of her body at night. She denies leg weakness She denies bowel bladder difficulties.  Neck pain Pain:  6/10 Worse: 10/10 Quality: ache Frequency: constant Acupuncture on November 5, 2024 worked well to decrease her neck pain.  She wishes to continue this modality. She has pain that starts at the base of the neck and radiates out to the top of the shoulders.  The pain radiates to the mid humerus.  Does not radiate to her hands.  The pain is worse with use of her arms away from the body.

## 2025-03-19 NOTE — PHYSICAL EXAM
[FreeTextEntry1] : Physical Exam  General:     well developed, well nourished, in no acute distress.   Head:     normocephalic and atraumatic.   Eyes:     PERRL/EOM intact, conjunctiva and sclera clear with out nystagmus.   Ears:     TM's intact and clear with normal canals with grossly normal hearing.   Nose:     no deformity, discharge, inflammation, or lesions.   Mouth:     no deformity or lesions with good dentition.   Neck:    Spasm in the paracervical muscles R>L. Range of motion and rotation 0 to 60 degrees.  Negative Spurling's. Chest Wall:     no deformities  Lungs:     clear bilaterally to auscultation.   Heart:     non-displaced PMI, chest non-tender; regular rate and rhythm, S1, S2 without murmurs, rubs, or gallops Abdomen:      normal bowel sounds; no hepatosplenomegaly no ventral,umbilical hernias or masses noted.   Msk:      thoracic spine: Tenderness in the paraspinal muscles between the shoulder blades.  Pulses:     pulses normal in all 4 extremities.   Extremities:     LUE: Shoulder flexion 0-100 abduction 0-100 MS 4-/5 mild tender on palpation of the traps Elbow FAROM, MS 4/5 reflexes 2/4 mild tender with pain on resisted wrist extension Wrist FAROM, MS 5/5 reflexes 2/4 sensation is intact to light touch, pinprick and proprioception  RUE: Shoulder flexion 120 abduction 0-120 MS 4-/5 + tender on palpation of the top of the shoulder and anteriorly Elbow FAROM, MS 4/5 reflexes 2/4 Wrist FAROM, MS 5/5 reflexes 2/4 sensation is intact to light touch, pinprick and proprioception  LLE: Hip FAROM, MS 4/5 mild tender on palpation of the hip bursa knee: FAROM, MS 5/5 Reflexes 2/4 ankle: FAROM, MS 5/5 reflexes 2/4 sensation is intact to light touch, pinprick and proprioception  RLE: Hip FAROM, MS 4/5 Very tender palpation of the hip bursa knee: FAROM, MS 5/5 Reflexes 2/4 ankle: FAROM, MS 5/5 reflexes 2/4 sensation is intact to light touch, pinprick and proprioception  Neurologic:     Gait: Spontaneous, reciprocal and safe without an assistive device

## 2025-04-16 ENCOUNTER — APPOINTMENT (OUTPATIENT)
Dept: PHYSICAL MEDICINE AND REHAB | Facility: CLINIC | Age: 50
End: 2025-04-16

## 2025-06-03 RX ORDER — TIRZEPATIDE 5 MG/.5ML
5 INJECTION, SOLUTION SUBCUTANEOUS
Qty: 1 | Refills: 1 | Status: ACTIVE | COMMUNITY
Start: 2025-06-03 | End: 1900-01-01

## 2025-06-04 ENCOUNTER — RX RENEWAL (OUTPATIENT)
Age: 50
End: 2025-06-04

## 2025-06-17 ENCOUNTER — APPOINTMENT (OUTPATIENT)
Dept: INTERNAL MEDICINE | Facility: CLINIC | Age: 50
End: 2025-06-17

## 2025-06-18 ENCOUNTER — APPOINTMENT (OUTPATIENT)
Dept: RHEUMATOLOGY | Facility: CLINIC | Age: 50
End: 2025-06-18

## 2025-06-25 ENCOUNTER — APPOINTMENT (OUTPATIENT)
Dept: INTERNAL MEDICINE | Facility: CLINIC | Age: 50
End: 2025-06-25

## 2025-06-25 ENCOUNTER — RX RENEWAL (OUTPATIENT)
Age: 50
End: 2025-06-25

## 2025-07-02 ENCOUNTER — APPOINTMENT (OUTPATIENT)
Dept: PHYSICAL MEDICINE AND REHAB | Facility: CLINIC | Age: 50
End: 2025-07-02
Payer: COMMERCIAL

## 2025-07-02 ENCOUNTER — APPOINTMENT (OUTPATIENT)
Dept: INTERNAL MEDICINE | Facility: CLINIC | Age: 50
End: 2025-07-02

## 2025-07-02 VITALS
WEIGHT: 174 LBS | RESPIRATION RATE: 18 BRPM | BODY MASS INDEX: 27.97 KG/M2 | HEIGHT: 66 IN | SYSTOLIC BLOOD PRESSURE: 132 MMHG | HEART RATE: 101 BPM | DIASTOLIC BLOOD PRESSURE: 86 MMHG | OXYGEN SATURATION: 98 % | TEMPERATURE: 98.5 F

## 2025-07-02 LAB
FLUAV SPEC QL CULT: NEGATIVE
FLUBV AG SPEC QL IA: NEGATIVE
S PYO AG SPEC QL IA: NEGATIVE
SARS-COV-2 AG RESP QL IA.RAPID: NEGATIVE

## 2025-07-02 PROCEDURE — 87811 SARS-COV-2 COVID19 W/OPTIC: CPT | Mod: QW

## 2025-07-02 PROCEDURE — 87880 STREP A ASSAY W/OPTIC: CPT | Mod: QW

## 2025-07-02 PROCEDURE — 99214 OFFICE O/P EST MOD 30 MIN: CPT

## 2025-07-02 PROCEDURE — 87804 INFLUENZA ASSAY W/OPTIC: CPT | Mod: QW

## 2025-07-02 PROCEDURE — G2211 COMPLEX E/M VISIT ADD ON: CPT

## 2025-07-02 PROCEDURE — 99401 PREV MED CNSL INDIV APPRX 15: CPT

## 2025-07-02 RX ORDER — OMEPRAZOLE 40 MG/1
40 CAPSULE, DELAYED RELEASE ORAL
Qty: 30 | Refills: 1 | Status: ACTIVE | COMMUNITY
Start: 2025-07-02 | End: 1900-01-01

## 2025-07-03 PROBLEM — M25.552 HIP PAIN, LEFT: Status: ACTIVE | Noted: 2025-07-03

## 2025-07-03 PROBLEM — K21.9 GERD (GASTROESOPHAGEAL REFLUX DISEASE): Status: ACTIVE | Noted: 2025-07-02

## 2025-07-03 PROBLEM — J02.9 ACUTE PHARYNGITIS, UNSPECIFIED ETIOLOGY: Status: ACTIVE | Noted: 2025-07-03 | Resolved: 2025-08-02

## 2025-07-03 LAB
RESP PATH DNA+RNA PNL NPH NAA+NON-PROBE: NOT DETECTED
SARS-COV-2 RNA RESP QL NAA+PROBE: NOT DETECTED

## 2025-07-03 NOTE — PHYSICAL EXAM
[Soft] : abdomen soft [Non Tender] : non-tender [Non-distended] : non-distended [Declined Rectal Exam] : declined rectal exam [Normal] : no rash [Alert and Oriented x3] : oriented to person, place, and time [de-identified] : .  BMI is 28 [de-identified] : Throat injected postnasal drip negative [de-identified] : Tenderness deep palpation epigastrium [de-identified] : No flank pain

## 2025-07-03 NOTE — HISTORY OF PRESENT ILLNESS
[Mild] : mild [___ Days ago] : [unfilled] days ago [Constant] : constant [Congestion] : congestion [Cough] : cough [Rest] : rest [Activity] : with activity [Sore Throat] : no sore throat [FreeTextEntry8] : 50 y/o female patient in office with complaints of sore throat with nasal congestion sneezing cough.  Patient has a history of anemia obesity GERD hypertension low vitamin D.  Hypertension anxiety with depression.  Patient also complaining of bilateral hip pain  Medications include amlodipine duloxetine was given in the past as well as gabapentin naproxen omeprazole Zepbound  Voices no further complaints ROS as noted below Denies fever,  chills, body aches and SOB.  Or chest pain

## 2025-07-03 NOTE — COUNSELING
[Fall prevention counseling provided] : Fall prevention counseling provided [Adequate lighting] : Adequate lighting [No throw rugs] : No throw rugs [Use proper foot wear] : Use proper foot wear [Use recommended devices] : Use recommended devices [Behavioral health counseling provided] : Behavioral health counseling provided [Sleep ___ hours/day] : Sleep [unfilled] hours/day [Engage in a relaxing activity] : Engage in a relaxing activity [Plan in advance] : Plan in advance [None] : None [Good understanding] : Patient has a good understanding of lifestyle changes and steps needed to achieve self management goal [de-identified] : Total face-to-face time with patient - _10_ minutes; >50% involved counselling, review of labs/tests, and/or coordination of medical  Symptomatic patients : Test for influenza, if positive, treat for influenza and do not continue below.  1. Fever plus cough or shortness of breath : Test for RVP and COVID-19. 2.Indirect, circumstantial or unclear exposure to COVID-19, or other concerning cases not meeting above criteria: Please call [facility] to discuss testing.  +++ All above cases must be reported to the [facility] registry. +++  Asymptomatic patients:  1. Known first-degree direct-contact exposure to positive COVID-19 patient but asymptomatic: No testing PLUS 14 day self-quarantine. Pt to call if symptoms develop. Report to [facility] Registry. 2. No known exposure and asymptomatic, referred from outside healthcare organization: Please call [facility] to discuss testing.  3.All other asymptomatic patients with no known exposures: no testing, no exceptions.  Bp stable, continue with medications, dash diet, exercise, and dietary management.Continue to check home Bps. Dscd.D/E wt.loss needs to loose 10% TBW.DSCD.self monitoring, goal setting,problem solving w-b mod.portion control, avoidence of skipping meals, high glycemic foods,snacking and mindless eating in front of the tv.Suggested use of small plates and not keepingall of the food on the dinner table and leaving it at the stove top.Pt was also told to calorie count and an emely was recommended DSCD exercising 20 minutes per day:dscd:med /pharmaco bariatric tx options.   - Encouraged a low fat/low cholesterol diet  - Discussed Healthy eating, avoidance of concentrated sweets, and to include vegetables by at least 3 meals a day  - encouraged low glycemic fruits, grains and vegetables and a diet high in plant protein  - Discussed regular exercise  - Discussed importance of follow up physician visits  diet and exercise weight loss.  Low-salt low-fat ADA diet/ htn- Discussed diabetes physiology - Discussed importance of monitoring blood glucose levels - Encouraged a low fat/low cholesterol diet - Discussed symptoms of hyperglycemia and hypoglycemia - Discussed ADA glucose goals - Discussed  HGB A1c and the effects of blood glucose on the level - Discussed Healthy eating, avoidance of concentrated sweets, and to include vegetables by at least 2 meals a day - Discussed regular exercise - Discussed importance of follow up physician visits Limit intake of Sodium (Salt) to less than 2 grams a day to prevent fluid retention-swelling or worsening of symptoms. The importance of keeping the blood pressure at or below 130/80 to prevent stroke, heart attacks, kidney failure, blindness, and loss of limbs was  low chol diet. Avoid fried foods, red meat, butter, eggs, hard cheeses. Use canola or olive oil preferred. ::  was established in which goals would be set, monitoring would be done, and problem solving would also be addressed. The patient would be assisted using behavior change techniques, such as self-help and counseling through behavioral modification: Problem solving using hypnosis and positive medical reinforcement to achieve agreed-upon goals.

## 2025-07-03 NOTE — HISTORY OF PRESENT ILLNESS
[Mild] : mild [___ Days ago] : [unfilled] days ago [Constant] : constant [Congestion] : congestion [Cough] : cough [Rest] : rest [Activity] : with activity [Sore Throat] : no sore throat [FreeTextEntry8] : 48 y/o female patient in office with complaints of sore throat with nasal congestion sneezing cough.  Patient has a history of anemia obesity GERD hypertension low vitamin D.  Hypertension anxiety with depression.  Patient also complaining of bilateral hip pain  Medications include amlodipine duloxetine was given in the past as well as gabapentin naproxen omeprazole Zepbound  Voices no further complaints ROS as noted below Denies fever,  chills, body aches and SOB.  Or chest pain

## 2025-07-03 NOTE — PHYSICAL EXAM
[Soft] : abdomen soft [Non Tender] : non-tender [Non-distended] : non-distended [Declined Rectal Exam] : declined rectal exam [Normal] : no rash [Alert and Oriented x3] : oriented to person, place, and time [de-identified] : .  BMI is 28 [de-identified] : Throat injected postnasal drip negative [de-identified] : Tenderness deep palpation epigastrium [de-identified] : No flank pain

## 2025-07-03 NOTE — COUNSELING
[Fall prevention counseling provided] : Fall prevention counseling provided [Adequate lighting] : Adequate lighting [No throw rugs] : No throw rugs [Use proper foot wear] : Use proper foot wear [Use recommended devices] : Use recommended devices [Behavioral health counseling provided] : Behavioral health counseling provided [Sleep ___ hours/day] : Sleep [unfilled] hours/day [Engage in a relaxing activity] : Engage in a relaxing activity [Plan in advance] : Plan in advance [None] : None [Good understanding] : Patient has a good understanding of lifestyle changes and steps needed to achieve self management goal [de-identified] : Total face-to-face time with patient - _10_ minutes; >50% involved counselling, review of labs/tests, and/or coordination of medical  Symptomatic patients : Test for influenza, if positive, treat for influenza and do not continue below.  1. Fever plus cough or shortness of breath : Test for RVP and COVID-19. 2.Indirect, circumstantial or unclear exposure to COVID-19, or other concerning cases not meeting above criteria: Please call [facility] to discuss testing.  +++ All above cases must be reported to the [facility] registry. +++  Asymptomatic patients:  1. Known first-degree direct-contact exposure to positive COVID-19 patient but asymptomatic: No testing PLUS 14 day self-quarantine. Pt to call if symptoms develop. Report to [facility] Registry. 2. No known exposure and asymptomatic, referred from outside healthcare organization: Please call [facility] to discuss testing.  3.All other asymptomatic patients with no known exposures: no testing, no exceptions.  Bp stable, continue with medications, dash diet, exercise, and dietary management.Continue to check home Bps. Dscd.D/E wt.loss needs to loose 10% TBW.DSCD.self monitoring, goal setting,problem solving w-b mod.portion control, avoidence of skipping meals, high glycemic foods,snacking and mindless eating in front of the tv.Suggested use of small plates and not keepingall of the food on the dinner table and leaving it at the stove top.Pt was also told to calorie count and an emely was recommended DSCD exercising 20 minutes per day:dscd:med /pharmaco bariatric tx options.   - Encouraged a low fat/low cholesterol diet  - Discussed Healthy eating, avoidance of concentrated sweets, and to include vegetables by at least 3 meals a day  - encouraged low glycemic fruits, grains and vegetables and a diet high in plant protein  - Discussed regular exercise  - Discussed importance of follow up physician visits  diet and exercise weight loss.  Low-salt low-fat ADA diet/ htn- Discussed diabetes physiology - Discussed importance of monitoring blood glucose levels - Encouraged a low fat/low cholesterol diet - Discussed symptoms of hyperglycemia and hypoglycemia - Discussed ADA glucose goals - Discussed  HGB A1c and the effects of blood glucose on the level - Discussed Healthy eating, avoidance of concentrated sweets, and to include vegetables by at least 2 meals a day - Discussed regular exercise - Discussed importance of follow up physician visits Limit intake of Sodium (Salt) to less than 2 grams a day to prevent fluid retention-swelling or worsening of symptoms. The importance of keeping the blood pressure at or below 130/80 to prevent stroke, heart attacks, kidney failure, blindness, and loss of limbs was  low chol diet. Avoid fried foods, red meat, butter, eggs, hard cheeses. Use canola or olive oil preferred. ::  was established in which goals would be set, monitoring would be done, and problem solving would also be addressed. The patient would be assisted using behavior change techniques, such as self-help and counseling through behavioral modification: Problem solving using hypnosis and positive medical reinforcement to achieve agreed-upon goals.

## 2025-07-03 NOTE — REVIEW OF SYSTEMS
[Nasal Discharge] : nasal discharge [Sore Throat] : sore throat [Cough] : cough [Heartburn] : heartburn [Joint Pain] : joint pain [Muscle Pain] : muscle pain [Anxiety] : anxiety [Negative] : Heme/Lymph [FreeTextEntry7] : GERD [FreeTextEntry9] : Bilateral hip pain

## 2025-07-07 NOTE — HISTORY OF PRESENT ILLNESS
[FreeTextEntry1] : The patient was last seen in March 19, 2025.  49-year-old female Is followed in the office for diffuse joint pain.  She needs a FMLA form completed. Presents with an exacerbation of bilateral hip pain. She has bilateral hip pain for 3 months.  She tried to self treat with over-the-counter nonsteroidal anti-inflammatories and rest.  Her pain is waking her up at night.  Injection to her hip bursa's helped to decrease her pain.  Her pain has been slowly increasing. She continues in restorative physical therapy 2 times a week for the last 3 months.  She states she had to miss some visits because she is working overtime.  Bilateral hip pain right hip  Pain:  9/10 Worse: 10/10 Quality: sharp  Frequency: constant left hip  Pain:  6/10 Worse: 10/10 Quality: sharp  Frequency: constant The pain starts over the lateral aspect of both hips.  The pain radiates toward the back.  The pain is worse with sitting more than 20 minutes of standing more than 20 minutes she cannot lay on either side of her body at night. She denies leg weakness She denies bowel bladder difficulties.  Neck pain Pain:  6/10 Worse: 10/10 Quality: ache Frequency: constant Acupuncture on November 5, 2024 worked well to decrease her neck pain.  She wishes to continue this modality. She has pain that starts at the base of the neck and radiates out to the top of the shoulders.  The pain radiates to the mid humerus.  Does not radiate to her hands.  The pain is worse with use of her arms away from the body.  Gabapentin 100 mg 2 tabs p.o. 3 times daily and naproxen 500 mg as needed with food has been controlling her pain that allows her to perform work barely The patient has run out of gabapentin and naproxen.

## 2025-07-07 NOTE — PHYSICAL EXAM
[FreeTextEntry1] : Physical Exam  General:     well developed, well nourished, in no acute distress.   Head:     normocephalic and atraumatic.   Eyes:     PERRL/EOM intact, conjunctiva and sclera clear with out nystagmus.   Ears:     TM's intact and clear with normal canals with grossly normal hearing.   Nose:     no deformity, discharge, inflammation, or lesions.   Mouth:     no deformity or lesions with good dentition.   Neck:    Spasm in the paracervical muscles R>L. Range of motion and rotation 0 to 60 degrees.  Negative Spurling's. Chest Wall:     no deformities  Lungs:     clear bilaterally to auscultation.   Heart:     non-displaced PMI, chest non-tender; regular rate and rhythm, S1, S2 without murmurs, rubs, or gallops Abdomen:      normal bowel sounds; no hepatosplenomegaly no ventral,umbilical hernias or masses noted.   Msk:      thoracic spine: Tenderness in the paraspinal muscles between the shoulder blades.  Pulses:     pulses normal in all 4 extremities.   Extremities:     LUE: Shoulder flexion 0-120 abduction 0-120 MS 4/5 mild tender on palpation of the traps Elbow FAROM, MS 4/5 reflexes 2/4 mild tender with pain on resisted wrist extension Wrist FAROM, MS 5/5 reflexes 2/4 sensation is intact to light touch, pinprick and proprioception  RUE: Shoulder flexion 120 abduction 0-120 MS 4/5 + tender on palpation of the top of the shoulder and anteriorly Elbow FAROM, MS 4/5 reflexes 2/4 Wrist FAROM, MS 5/5 reflexes 2/4 sensation is intact to light touch, pinprick and proprioception  LLE: Hip FAROM, MS 4/5 mild tender on palpation of the hip bursa knee: FAROM, MS 5/5 Reflexes 2/4 ankle: FAROM, MS 5/5 reflexes 2/4 sensation is intact to light touch, pinprick and proprioception  RLE: Hip FAROM, MS 4/5 Very tender palpation of the hip bursa knee: FAROM, MS 5/5 Reflexes 2/4 ankle: FAROM, MS 5/5 reflexes 2/4 sensation is intact to light touch, pinprick and proprioception  Neurologic:     Gait: Spontaneous, reciprocal and safe without an assistive device

## 2025-07-11 ENCOUNTER — APPOINTMENT (OUTPATIENT)
Dept: INTERNAL MEDICINE | Facility: CLINIC | Age: 50
End: 2025-07-11
Payer: COMMERCIAL

## 2025-07-11 ENCOUNTER — NON-APPOINTMENT (OUTPATIENT)
Age: 50
End: 2025-07-11

## 2025-07-11 VITALS
BODY MASS INDEX: 28.28 KG/M2 | HEIGHT: 66 IN | SYSTOLIC BLOOD PRESSURE: 146 MMHG | OXYGEN SATURATION: 98 % | RESPIRATION RATE: 18 BRPM | HEART RATE: 100 BPM | WEIGHT: 176 LBS | DIASTOLIC BLOOD PRESSURE: 84 MMHG | TEMPERATURE: 98.1 F

## 2025-07-11 PROBLEM — J20.9 ACUTE BRONCHITIS, UNSPECIFIED ORGANISM: Status: ACTIVE | Noted: 2025-07-11 | Resolved: 2025-08-10

## 2025-07-11 PROCEDURE — 99214 OFFICE O/P EST MOD 30 MIN: CPT

## 2025-07-11 PROCEDURE — G2211 COMPLEX E/M VISIT ADD ON: CPT

## 2025-07-11 RX ORDER — AZITHROMYCIN 250 MG/1
250 TABLET, FILM COATED ORAL
Qty: 1 | Refills: 0 | Status: ACTIVE | COMMUNITY
Start: 2025-07-11 | End: 1900-01-01

## 2025-07-11 RX ORDER — ALBUTEROL SULFATE 90 UG/1
108 (90 BASE) INHALANT RESPIRATORY (INHALATION)
Qty: 1 | Refills: 2 | Status: ACTIVE | COMMUNITY
Start: 2025-07-11 | End: 1900-01-01

## 2025-07-11 RX ORDER — AMLODIPINE AND OLMESARTAN MEDOXOMIL 5; 20 MG/1; MG/1
5-20 TABLET ORAL
Qty: 30 | Refills: 5 | Status: ACTIVE | COMMUNITY
Start: 2025-07-11 | End: 1900-01-01

## 2025-07-11 RX ORDER — PROMETHAZINE HYDROCHLORIDE AND DEXTROMETHORPHAN HYDROBROMIDE ORAL SOLUTION 15; 6.25 MG/5ML; MG/5ML
6.25-15 SOLUTION ORAL
Qty: 1 | Refills: 3 | Status: ACTIVE | COMMUNITY
Start: 2025-07-11 | End: 1900-01-01

## 2025-07-11 NOTE — HISTORY OF PRESENT ILLNESS
[FreeTextEntry1] : cough [de-identified] : 1m of cough, went to urgent care productive.  was seen 2wks ago and was given omeprazole on zepbound-but no gerd and hasnt been on it for a few weeks on amlo/benzapril Reviewed all interim as well as relevant prior consultations, labs and radiological studies.

## 2025-07-11 NOTE — ASSESSMENT
[FreeTextEntry1] : acute bronchitis- zpak, prometh dm, halls, alb mdi, cxr, Monitor symptoms and report any changes or concerns.  d/c ace htn- change ammlo/stella to amlo/olm, low Na diet, exc, w/l Discussed the importance and benefit of a healthy lifestyle including a heart healthy diet such as the Mediterranean diet and regular exercise as well as 7 hours of sleep nightly.  Pt. was encouraged to do all relevant screening tests including but not limited to mammogram, gyn visit, colonoscopy, bone density, annual skin exam and annual CT chest if smoker and meets criteria   Patient was given an opportunity to ask questions regarding this visit and is satisfied and in agreement with the plan of care. They are instructed to call with any questions, concerns, worsening or new symptoms.  [Vaccines Reviewed] : Immunizations reviewed today. Please see immunization details in the vaccine log within the immunization flowsheet.

## 2025-08-11 ENCOUNTER — APPOINTMENT (OUTPATIENT)
Dept: PHYSICAL MEDICINE AND REHAB | Facility: CLINIC | Age: 50
End: 2025-08-11
Payer: COMMERCIAL

## 2025-08-11 ENCOUNTER — APPOINTMENT (OUTPATIENT)
Dept: INTERNAL MEDICINE | Facility: CLINIC | Age: 50
End: 2025-08-11
Payer: COMMERCIAL

## 2025-08-11 ENCOUNTER — LABORATORY RESULT (OUTPATIENT)
Age: 50
End: 2025-08-11

## 2025-08-11 VITALS
SYSTOLIC BLOOD PRESSURE: 130 MMHG | RESPIRATION RATE: 17 BRPM | TEMPERATURE: 98.2 F | HEIGHT: 66 IN | WEIGHT: 178 LBS | BODY MASS INDEX: 28.61 KG/M2 | OXYGEN SATURATION: 98 % | DIASTOLIC BLOOD PRESSURE: 90 MMHG | HEART RATE: 93 BPM

## 2025-08-11 DIAGNOSIS — M70.61 TROCHANTERIC BURSITIS, RIGHT HIP: ICD-10-CM

## 2025-08-11 DIAGNOSIS — M70.62 TROCHANTERIC BURSITIS, RIGHT HIP: ICD-10-CM

## 2025-08-11 DIAGNOSIS — M25.50 PAIN IN UNSPECIFIED JOINT: ICD-10-CM

## 2025-08-11 DIAGNOSIS — J30.9 ALLERGIC RHINITIS, UNSPECIFIED: ICD-10-CM

## 2025-08-11 DIAGNOSIS — R05.3 CHRONIC COUGH: ICD-10-CM

## 2025-08-11 PROCEDURE — 99214 OFFICE O/P EST MOD 30 MIN: CPT | Mod: 25

## 2025-08-11 PROCEDURE — 99214 OFFICE O/P EST MOD 30 MIN: CPT

## 2025-08-11 PROCEDURE — G2211 COMPLEX E/M VISIT ADD ON: CPT

## 2025-08-11 PROCEDURE — 20610 DRAIN/INJ JOINT/BURSA W/O US: CPT | Mod: 50

## 2025-08-11 RX ORDER — AZELASTINE HYDROCHLORIDE AND FLUTICASONE PROPIONATE 137; 50 UG/1; UG/1
137-50 SPRAY, METERED NASAL
Qty: 1 | Refills: 5 | Status: ACTIVE | COMMUNITY
Start: 2025-08-11 | End: 1900-01-01

## 2025-08-11 RX ORDER — FAMOTIDINE 20 MG/1
20 TABLET, FILM COATED ORAL
Qty: 60 | Refills: 5 | Status: ACTIVE | COMMUNITY
Start: 2025-08-11 | End: 1900-01-01

## 2025-08-11 RX ORDER — LEVOCETIRIZINE DIHYDROCHLORIDE 5 MG/1
5 TABLET ORAL
Qty: 1 | Refills: 5 | Status: ACTIVE | COMMUNITY
Start: 2025-08-11 | End: 1900-01-01

## 2025-08-13 LAB
A ALTERNATA IGE QN: <0.1 KUA/L
A FUMIGATUS IGE QN: <0.1 KUA/L
BERMUDA GRASS IGE QN: <0.1 KUA/L
BOXELDER IGE QN: <0.1 KUA/L
C HERBARUM IGE QN: <0.1 KUA/L
CALIF WALNUT IGE QN: <0.1 KUA/L
CAT DANDER IGE QN: <0.1 KUA/L
CMN PIGWEED IGE QN: <0.1 KUA/L
COMMON RAGWEED IGE QN: <0.1 KUA/L
COTTONWOOD IGE QN: <0.1 KUA/L
D FARINAE IGE QN: 0.79 KUA/L
D PTERONYSS IGE QN: 0.39 KUA/L
DEPRECATED A ALTERNATA IGE RAST QL: 0
DEPRECATED A FUMIGATUS IGE RAST QL: 0
DEPRECATED BERMUDA GRASS IGE RAST QL: 0
DEPRECATED BOXELDER IGE RAST QL: 0
DEPRECATED C HERBARUM IGE RAST QL: 0
DEPRECATED CALIF WALNUT POLN IGE RAST QL: 0
DEPRECATED CAT DANDER IGE RAST QL: 0
DEPRECATED COMMON PIGWEED IGE RAST QL: 0
DEPRECATED COMMON RAGWEED IGE RAST QL: 0
DEPRECATED COTTONWOOD IGE RAST QL: 0
DEPRECATED D FARINAE IGE RAST QL: 2
DEPRECATED D PTERONYSS IGE RAST QL: 1
DEPRECATED DOG DANDER IGE RAST QL: 0
DEPRECATED GOOSEFOOT IGE RAST QL: 0
DEPRECATED LONDON PLANE IGE RAST QL: 0
DEPRECATED MOUSE URINE PROT IGE RAST QL: 0
DEPRECATED MUGWORT IGE RAST QL: 0
DEPRECATED P NOTATUM IGE RAST QL: 0
DEPRECATED RED CEDAR IGE RAST QL: 0
DEPRECATED ROACH IGE RAST QL: 0
DEPRECATED SHEEP SORREL IGE RAST QL: 0
DEPRECATED SILVER BIRCH IGE RAST QL: 0
DEPRECATED TIMOTHY IGE RAST QL: 0
DEPRECATED WHITE ASH IGE RAST QL: 0
DEPRECATED WHITE ELM IGE RAST QL: 0
DEPRECATED WHITE MULBERRY IGE RAST QL: 0
DEPRECATED WHITE OAK IGE RAST QL: 0
DOG DANDER IGE QN: <0.1 KUA/L
GOOSEFOOT IGE QN: <0.1 KUA/L
LONDON PLANE IGE QN: <0.1 KUA/L
MOUSE URINE PROT IGE QN: <0.1 KUA/L
MT JUNIPER IGE QN: <0.1 KUA/L
MUGWORT IGE QN: <0.1 KUA/L
P NOTATUM IGE QN: <0.1 KUA/L
ROACH IGE QN: <0.1 KUA/L
SHEEP SORREL IGE QN: <0.1 KUA/L
SILVER BIRCH IGE QN: <0.1 KUA/L
TIMOTHY IGE QN: <0.1 KUA/L
TOTAL IGE SMQN RAST: 18 KU/L
WHITE ASH IGE QN: <0.1 KUA/L
WHITE ELM IGE QN: <0.1 KUA/L
WHITE MULBERRY IGE QN: <0.1 KUA/L
WHITE OAK IGE QN: <0.1 KUA/L

## 2025-08-14 RX ORDER — LIDOCAINE HCL/PF 10 MG/ML
1 VIAL (ML) INJECTION
Refills: 0 | Status: COMPLETED | OUTPATIENT
Start: 2025-08-14

## 2025-08-14 RX ORDER — TRIAMCINOLONE ACETONIDE 40 MG/ML
40 SUSPENSION INTRA-ARTERIAL; INTRAMUSCULAR
Qty: 1 | Refills: 0 | Status: COMPLETED | OUTPATIENT
Start: 2025-08-14

## 2025-08-14 RX ADMIN — TRIAMCINOLONE ACETONIDE 40 MG/ML: 40 INJECTION, SUSPENSION INTRA-ARTICULAR; INTRAMUSCULAR at 00:00

## 2025-08-14 RX ADMIN — LIDOCAINE HYDROCHLORIDE 1 %: 10 INJECTION, SOLUTION INFILTRATION; PERINEURAL at 00:00

## 2025-08-26 ENCOUNTER — APPOINTMENT (OUTPATIENT)
Dept: INTERNAL MEDICINE | Facility: CLINIC | Age: 50
End: 2025-08-26

## 2025-09-18 ENCOUNTER — RX RENEWAL (OUTPATIENT)
Age: 50
End: 2025-09-18